# Patient Record
Sex: FEMALE | Race: BLACK OR AFRICAN AMERICAN | URBAN - METROPOLITAN AREA
[De-identification: names, ages, dates, MRNs, and addresses within clinical notes are randomized per-mention and may not be internally consistent; named-entity substitution may affect disease eponyms.]

---

## 2017-02-06 ENCOUNTER — OFFICE VISIT (OUTPATIENT)
Dept: FAMILY MEDICINE CLINIC | Age: 37
End: 2017-02-06

## 2017-02-06 ENCOUNTER — HOSPITAL ENCOUNTER (OUTPATIENT)
Dept: LAB | Age: 37
Discharge: HOME | End: 2017-02-06
Payer: COMMERCIAL

## 2017-02-06 VITALS
BODY MASS INDEX: 40.97 KG/M2 | SYSTOLIC BLOOD PRESSURE: 152 MMHG | HEIGHT: 64 IN | HEART RATE: 120 BPM | TEMPERATURE: 98.7 F | OXYGEN SATURATION: 99 % | WEIGHT: 240 LBS | RESPIRATION RATE: 20 BRPM | DIASTOLIC BLOOD PRESSURE: 104 MMHG

## 2017-02-06 DIAGNOSIS — E66.01 MORBID OBESITY DUE TO EXCESS CALORIES (HCC): ICD-10-CM

## 2017-02-06 DIAGNOSIS — L83 ACANTHOSIS: ICD-10-CM

## 2017-02-06 DIAGNOSIS — I10 ESSENTIAL HYPERTENSION: Primary | ICD-10-CM

## 2017-02-06 DIAGNOSIS — Z72.0 TOBACCO USE: ICD-10-CM

## 2017-02-06 DIAGNOSIS — I10 ESSENTIAL HYPERTENSION: ICD-10-CM

## 2017-02-06 LAB
ALBUMIN SERPL BCP-MCNC: 3.5 G/DL (ref 3.4–5)
ALBUMIN/GLOB SERPL: 1 {RATIO} (ref 0.8–1.7)
ALP SERPL-CCNC: 111 U/L (ref 45–117)
ALT SERPL-CCNC: 49 U/L (ref 13–56)
ANION GAP BLD CALC-SCNC: 12 MMOL/L (ref 3–18)
AST SERPL W P-5'-P-CCNC: 53 U/L (ref 15–37)
BILIRUB SERPL-MCNC: 0.2 MG/DL (ref 0.2–1)
BUN SERPL-MCNC: 9 MG/DL (ref 7–18)
BUN/CREAT SERPL: 9 (ref 12–20)
CALCIUM SERPL-MCNC: 9 MG/DL (ref 8.5–10.1)
CHLORIDE SERPL-SCNC: 103 MMOL/L (ref 100–108)
CHOLEST SERPL-MCNC: 185 MG/DL
CO2 SERPL-SCNC: 25 MMOL/L (ref 21–32)
CREAT SERPL-MCNC: 0.95 MG/DL (ref 0.6–1.3)
ERYTHROCYTE [DISTWIDTH] IN BLOOD BY AUTOMATED COUNT: 18.5 % (ref 11.6–14.5)
GLOBULIN SER CALC-MCNC: 3.6 G/DL (ref 2–4)
GLUCOSE SERPL-MCNC: 72 MG/DL (ref 74–99)
HBA1C MFR BLD: 5.3 % (ref 4.2–5.6)
HCT VFR BLD AUTO: 34.8 % (ref 35–45)
HDLC SERPL-MCNC: 63 MG/DL (ref 40–60)
HDLC SERPL: 2.9 {RATIO} (ref 0–5)
HGB BLD-MCNC: 10.7 G/DL (ref 12–16)
LDLC SERPL CALC-MCNC: 84.4 MG/DL (ref 0–100)
LIPID PROFILE,FLP: ABNORMAL
MCH RBC QN AUTO: 24.7 PG (ref 24–34)
MCHC RBC AUTO-ENTMCNC: 30.7 G/DL (ref 31–37)
MCV RBC AUTO: 80.2 FL (ref 74–97)
PLATELET # BLD AUTO: 418 K/UL (ref 135–420)
PMV BLD AUTO: 9.3 FL (ref 9.2–11.8)
POTASSIUM SERPL-SCNC: 3.9 MMOL/L (ref 3.5–5.5)
PROT SERPL-MCNC: 7.1 G/DL (ref 6.4–8.2)
RBC # BLD AUTO: 4.34 M/UL (ref 4.2–5.3)
SODIUM SERPL-SCNC: 140 MMOL/L (ref 136–145)
TRIGL SERPL-MCNC: 188 MG/DL (ref ?–150)
VLDLC SERPL CALC-MCNC: 37.6 MG/DL
WBC # BLD AUTO: 6 K/UL (ref 4.6–13.2)

## 2017-02-06 PROCEDURE — 85027 COMPLETE CBC AUTOMATED: CPT | Performed by: INTERNAL MEDICINE

## 2017-02-06 PROCEDURE — 80061 LIPID PANEL: CPT | Performed by: INTERNAL MEDICINE

## 2017-02-06 PROCEDURE — 36415 COLL VENOUS BLD VENIPUNCTURE: CPT | Performed by: INTERNAL MEDICINE

## 2017-02-06 PROCEDURE — 80053 COMPREHEN METABOLIC PANEL: CPT | Performed by: INTERNAL MEDICINE

## 2017-02-06 PROCEDURE — 83036 HEMOGLOBIN GLYCOSYLATED A1C: CPT | Performed by: INTERNAL MEDICINE

## 2017-02-06 RX ORDER — TRIAMTERENE AND HYDROCHLOROTHIAZIDE 37.5; 25 MG/1; MG/1
1 CAPSULE ORAL DAILY
Qty: 90 CAP | Refills: 0 | Status: SHIPPED | OUTPATIENT
Start: 2017-02-06 | End: 2017-03-13 | Stop reason: SDUPTHER

## 2017-02-06 RX ORDER — AMLODIPINE AND BENAZEPRIL HYDROCHLORIDE 10; 20 MG/1; MG/1
1 CAPSULE ORAL DAILY
Qty: 90 CAP | Refills: 0 | Status: SHIPPED | OUTPATIENT
Start: 2017-02-06 | End: 2017-03-13 | Stop reason: SDUPTHER

## 2017-02-06 RX ORDER — TRIAMTERENE AND HYDROCHLOROTHIAZIDE 37.5; 25 MG/1; MG/1
CAPSULE ORAL DAILY
COMMUNITY
End: 2017-02-06 | Stop reason: SDUPTHER

## 2017-02-06 RX ORDER — AMLODIPINE AND BENAZEPRIL HYDROCHLORIDE 10; 20 MG/1; MG/1
1 CAPSULE ORAL DAILY
COMMUNITY
End: 2017-02-06 | Stop reason: SDUPTHER

## 2017-02-06 NOTE — PROGRESS NOTES
Assessment/Plan:    Jas was seen today for hypertension. Diagnoses and all orders for this visit:    Essential hypertension- elevated. F/u in 1 mo. Cont current regimen. Dash diet. -     amLODIPine-benazepril (LOTREL) 10-20 mg per capsule; Take 1 Cap by mouth daily. -     triamterene-hydroCHLOROthiazide (DYAZIDE) 37.5-25 mg per capsule; Take 1 Cap by mouth daily.  -     METABOLIC PANEL, COMPREHENSIVE; Future  -     LIPID PANEL; Future  -     CBC W/O DIFF; Future    Morbid obesity due to excess calories (HCC)  -     naltrexone-buPROPion (CONTRAVE) 8-90 mg TbER ER tablet; Week 1 1 tab PO QAM, Week 2 1QAM 1QHS, Week 3 2QAM 1 QHS, Week 4 & beyond 2QAM 2QHS    Tobacco use  - bupropion will help with cessation. Acanthosis  -     HEMOGLOBIN A1C W/O EAG; Future        The plan was discussed with the patient. The patient verbalized understanding and is in agreement with the plan. All medication potential side effects were discussed with the patient. Health Maintenance: pap- 1/1/2016    Radha Santiago is a 39 y.o.  female and presents with Esophageal Cancer     Subjective:  Pt is here to establish care. HTN - states she has white coat syndrome. States it's much better when she checks it at work. Obesity - states she wants to lose weight. She occ eats breakfast (boiled egg and sausage or a parfait). Eats salad for lunch. States she skips dinner sometimes, or she will eat baked meat. Only occ drinks soda or sweetened beverages. ROS:  Constitutional: No recent weight change. No weakness/fatigue. No f/c. Skin: No rashes, change in nails/hair, itching   HENT: No HA, dizziness. No hearing loss/tinnitus. No nasal congestion/discharge. Eyes: No change in vision, double/blurred vision or eye pain/redness. Cardiovascular: No CP/palpitations. No PAYNE/orthopnea/PND. Respiratory: No cough/sputum, dyspnea, wheezing. Gastointestinal: No dysphagia, reflux. No n/v. No constipation/diarrhea.   No melena/rectal bleeding. Genitourinary: No dysuria, urinary hesitancy, nocturia, hematuria. No incontinence. Musculoskeletal: No joint pain/stiffness. No muscle pain/tenderness. Endo: No heat/cold intolerance, no polyuria/polydypsia. Heme: No h/o anemia. No easy bleeding/bruising. Allergy/Immunology: No seasonal rhinitis. Denies frequent colds, sinus/ear infections. Neurological: No seizures/numbness/weakness. No paresthesias. Psychiatric:  No depression, anxiety. PMH:  Past Medical History   Diagnosis Date    Hypertension      PSH:  History reviewed. No pertinent past surgical history. SH:  Social History   Substance Use Topics    Smoking status: Current Every Day Smoker     Packs/day: 0.25     Years: 10.00    Smokeless tobacco: Never Used    Alcohol use 2.4 oz/week     0 Cans of beer, 4 Glasses of wine, 0 Shots of liquor per week     FH:  Family History   Problem Relation Age of Onset    Hypertension Mother     No Known Problems Father      Medications/Allergies:    Current Outpatient Prescriptions:     amLODIPine-benazepril (LOTREL) 10-20 mg per capsule, Take 1 Cap by mouth daily. , Disp: , Rfl:     triamterene-hydroCHLOROthiazide (DYAZIDE) 37.5-25 mg per capsule, Take  by mouth daily. , Disp: , Rfl:   No Known Allergies    Objective:  Visit Vitals    BP (!) 152/104 (BP 1 Location: Left arm, BP Patient Position: Sitting)    Pulse (!) 120    Temp 98.7 °F (37.1 °C)    Resp 20    Ht 5' 4\" (1.626 m)    Wt 240 lb (108.9 kg)    LMP 02/02/2017    SpO2 99%    BMI 41.2 kg/m2      Constitutional: Well developed, nourished, no distress, alert, obese habitus   HENT: Exterior ears and tympanic membranes normal bilaterally. Supple neck. No thyromegaly or lymphadenopathy. Oropharynx clear and moist mucous membranes. Eyes: Conjunctiva normal. PERRL. Cardiovascular: S1, S2.  Reg, tachy. No murmurs/rubs. No thrills palpated. No carotid bruits. Intact distal pulses. No edema. Pulmonary/Chest Wall: No abnormalities on inspection. Clear to auscultation bilaterally. No wheezing/rhonchi. Normal effort. GI: Soft, nontender, nondistended. Normal active bowel sounds. No  masses on palpation. No hepatosplenomegaly. Musculoskeletal: Gait normal.  Joints without deformity/tenderness. Neurological: Appropriate. No focal motor or sensory deficits. Speech normal.   Skin: No lesions/rashes on inspection. +acanthosis   Psych: Appropriate affect, judgement and insight. Short-term memory intact.

## 2017-02-06 NOTE — PROGRESS NOTES
Leatha Merlos is a 39 y.o. female  Chief Complaint   Patient presents with    Esophageal Cancer     1. Have you been to the ER, urgent care clinic since your last visit? Hospitalized since your last visit? No    2. Have you seen or consulted any other health care providers outside of the [facility] since your last visit? Include any pap smears or colon screening.  No    Pt states that all of her vaccinations are up to date  Pt is fasting today

## 2017-02-06 NOTE — PATIENT INSTRUCTIONS
Naltrexone/Bupropion (By mouth)   Bupropion Hydrochloride (olx-CTLY-jtu-on adele-droe-KLOR-carmelo), Naltrexone Hydrochloride (nal-TREX-one adele-droe-KLOR-carmelo)  Used with diet and exercise to help you lose weight. Brand Name(s):Contrave   There may be other brand names for this medicine. When This Medicine Should Not Be Used: This medicine is not right for everyone. Do not use it if you had an allergic reaction to naltrexone or bupropion, you are pregnant, or you have seizures, anorexia, or bulimia. How to Use This Medicine:   Long Acting Tablet  · Take your medicine as directed. Your dose may need to be changed several times to find what works best for you. · It is best to take this medicine with food or milk. However, do not take this medicine with high-fat meals. This may increase your risk of seizures. · Swallow the extended-release tablet whole. Do not crush, break, or chew it. · This medicine should come with a Medication Guide. Ask your pharmacist for a copy if you do not have one. · Missed dose: Skip the missed dose and go back to your regular dosing schedule. Never take extra medicine to make up for a missed dose. · Store the medicine in a closed container at room temperature, away from heat, moisture, and direct light. Drugs and Foods to Avoid:   Ask your doctor or pharmacist before using any other medicine, including over-the-counter medicines, vitamins, and herbal products. · Do not use this medicine and an MAO inhibitor (MAOI) within 14 days of each other. Do not take this medicine if you are using or have used heroin or other narcotic drugs (such as buprenorphine, codeine, methadone, or other habit-forming painkillers) within the past 7 to 10 days. Do not use naltrexone/bupropion if you are also using Zyban® to quit smoking or Aplenzin® or Wellbutrin® for depression, because they also contain bupropion. · Some medicines and foods can affect how naltrexone/bupropion works.  Tell your doctor if you are using any of the following:  ¨ Amantadine, amiloride, cimetidine, clopidogrel, dopamine, famotidine, levodopa, memantine, metformin, metoprolol, oxaliplatin, pindolol, ranitidine, theophylline, ticlopidine, varenicline  ¨ Insulin or diabetes medicine  ¨ Medicine to treat depression  ¨ Medicine to treat mental illness (haloperidol, risperidone, thioridazine)  ¨ Medicine to treat heart rhythm problems (flecainide, procainamide, propafenone)  ¨ Medicine to treat HIV or AIDS (efavirenz, lopinavir, ritonavir)  ¨ Medicine for pain, diarrhea, cough, or colds  ¨ Steroid (such as dexamethasone, hydrocortisone, methylprednisolone, prednisolone, prednisone)  · Limit alcohol, or do not drink alcohol at all while you are using this medicine. Warnings While Using This Medicine:   · It is not safe to take this medicine during pregnancy. It could harm an unborn baby. Tell your doctor right away if you become pregnant. · Tell your doctor if you have kidney disease, liver disease, diabetes, glaucoma, heart disease, or high blood pressure. · Tell your doctor if you take barbiturates, benzodiazepines, antiseizure medicine, or sedatives, or if you recently stopped taking them. Tell your doctor if you have a history of drug addiction, or if you drink alcohol. · For some children, teenagers, and young adults, this medicine may increase mental or emotional problems. This may lead to thoughts of suicide and violence. Talk with your doctor right away if you have any thoughts or behavior changes that concern you. Tell your doctor if you or anyone in your family has a history of bipolar disorder or suicide attempts.   · This medicine may cause the following problems:  ¨ Increased risk of seizure  ¨ High blood pressure or heart rate  ¨ Serious allergic and skin reactions  ¨ Liver problems  ¨ Increased risk of hypoglycemia (low blood sugar) in patients with diabetes  · Do not breastfeed while you are using this medicine, unless your doctor says it is okay. · You have a higher risk of accidental overdose, serious injury, or death if you use heroin or any other narcotic medicine while you are being treated with this medicine. Also, naltrexone prevents you from feeling the effects of heroin if you use it. · Do not stop using this medicine suddenly. Your doctor will need to slowly decrease your dose before you stop it completely. · Tell any doctor or dentist who treats you that you are using this medicine. This medicine may affect certain medical test results. · Your doctor will check your progress and the effects of this medicine at regular visits. Keep all appointments. · Keep all medicine out of the reach of children. Never share your medicine with anyone. Possible Side Effects While Using This Medicine:   Call your doctor right away if you notice any of these side effects:  · Allergic reaction: Itching or hives, swelling in your face or hands, swelling or tingling in your mouth or throat, chest tightness, trouble breathing  · Blistering, peeling, or red skin rash  · Chest pain, trouble breathing, fast, slow, or pounding heartbeat  · Dark urine or pale stools, nausea, vomiting, loss of appetite, stomach pain, yellow skin or eyes  · Eye pain, vision changes, seeing halos around lights  · Muscle or joint pain, fever with rash  · Seeing or hearing things that are not there, feeling like people are against you  · Seizures  · Sudden increase in energy, racing thoughts, trouble sleeping  · Thoughts of hurting yourself, worsening depression, severe agitation or confusion  If you notice these less serious side effects, talk with your doctor:   · Dry mouth  · Headache, dizziness  · Nausea, constipation, diarrhea  If you notice other side effects that you think are caused by this medicine, tell your doctor. Call your doctor for medical advice about side effects.  You may report side effects to FDA at 9-095-IDE-5219  © 2016 [facility] [address] [facility] is for End User's use only and may not be sold, redistributed or otherwise used for commercial purposes. The above information is an  only. It is not intended as medical advice for individual conditions or treatments. Talk to your doctor, nurse or pharmacist before following any medical regimen to see if it is safe and effective for you.

## 2017-02-06 NOTE — MR AVS SNAPSHOT
Visit Information Date & Time Provider Department Dept. Phone Encounter #  
 2/6/2017  8:30 AM Simona Caldera, [facility] [phone] [id number] Allergies as of 2/6/2017  Review Complete On: 2/6/2017 By: Martha Ortiz LPN No Known Allergies Current Immunizations  Never Reviewed No immunizations on file. Not reviewed this visit You Were Diagnosed With   
  
 Codes Comments Essential hypertension    -  Primary ICD-10-CM: I10 
ICD-9-CM: 401.9 Morbid obesity due to excess calories (HCC)     ICD-10-CM: E66.01 
ICD-9-CM: 278.01 Tobacco use     ICD-10-CM: Z72.0 ICD-9-CM: 305.1 Acanthosis     ICD-10-CM: L83 
ICD-9-CM: 701.2 Vitals BP Pulse Temp Resp Height(growth percentile) Weight(growth percentile) (!) 152/104 (BP 1 Location: Left arm, BP Patient Position: Sitting) (!) 120 98.7 °F (37.1 °C) 20 5' 4\" (1.626 m) 240 lb (108.9 kg) LMP SpO2 BMI Smoking Status 02/02/2017 99% 41.2 kg/m2 Current Every Day Smoker Vitals History BMI and BSA Data Body Mass Index Body Surface Area  
 41.2 kg/m 2 2.22 m 2 Preferred Pharmacy Pharmacy Name Phone [facility] [address] [facility] [place] [address] [address] [phone] Your Updated Medication List  
  
   
This list is accurate as of: 2/6/17  8:59 AM.  Always use your most recent med list. amLODIPine-benazepril 10-20 mg per capsule Commonly known as:  Ba Pilar Take 1 Cap by mouth daily. naltrexone-buPROPion 8-90 mg Tber ER tablet Commonly known as:  Mendez Valiente Week 1 1 tab PO QAM, Week 2 1QAM 1QHS, Week 3 2QAM 1 QHS, Week 4 & beyond 2QAM 2QHS  
  
 triamterene-hydroCHLOROthiazide 37.5-25 mg per capsule Commonly known as:  Ino Majors Take 1 Cap by mouth daily. Prescriptions Printed  Refills  
 naltrexone-buPROPion (CONTRAVE) 8-90 mg TbER ER tablet 0  
 Sig: Week 1 1 tab PO QAM, Week 2 1QAM 1QHS, Week 3 2QAM 1 QHS, Week 4 & beyond 2QAM 2QHS Class: Print Prescriptions Sent to Pharmacy Refills  
 amLODIPine-benazepril (LOTREL) 10-20 mg per capsule 0 Sig: Take 1 Cap by mouth daily. Class: Normal  
 Pharmacy: [facility] [address], [place] [address] [place] Ph #: [phone] Route: Oral  
 triamterene-hydroCHLOROthiazide (DYAZIDE) 37.5-25 mg per capsule 0 Sig: Take 1 Cap by mouth daily. Class: Normal  
 Pharmacy: [facility] [address] [facility] [place] [facility] [address] Ph #: [phone] Route: Oral  
  
To-Do List   
 02/06/2017 Lab:  CBC W/O DIFF   
  
 02/06/2017 Lab:  HEMOGLOBIN A1C W/O EAG   
  
 02/06/2017 Lab:  LIPID PANEL   
  
 02/06/2017 Lab:  METABOLIC PANEL, COMPREHENSIVE Patient Instructions Naltrexone/Bupropion (By mouth) Bupropion Hydrochloride (mqq-DNZF-trf-on adele-droe-KLOR-carmelo), Naltrexone Hydrochloride (nal-TREX-one adele-droe-KLOR-carmelo) Used with diet and exercise to help you lose weight. Brand Name(s):Contrave There may be other brand names for this medicine. When This Medicine Should Not Be Used: This medicine is not right for everyone. Do not use it if you had an allergic reaction to naltrexone or bupropion, you are pregnant, or you have seizures, anorexia, or bulimia. How to Use This Medicine:  
Long Acting Tablet · Take your medicine as directed. Your dose may need to be changed several times to find what works best for you. · It is best to take this medicine with food or milk. However, do not take this medicine with high-fat meals. This may increase your risk of seizures. · Swallow the extended-release tablet whole. Do not crush, break, or chew it. · This medicine should come with a Medication Guide. Ask your pharmacist for a copy if you do not have one. · Missed dose: Skip the missed dose and go back to your regular dosing schedule. Never take extra medicine to make up for a missed dose. · Store the medicine in a closed container at room temperature, away from heat, moisture, and direct light. Drugs and Foods to Avoid: Ask your doctor or pharmacist before using any other medicine, including over-the-counter medicines, vitamins, and herbal products. · Do not use this medicine and an MAO inhibitor (MAOI) within 14 days of each other. Do not take this medicine if you are using or have used heroin or other narcotic drugs (such as buprenorphine, codeine, methadone, or other habit-forming painkillers) within the past 7 to 10 days. Do not use naltrexone/bupropion if you are also using Zyban® to quit smoking or Aplenzin® or Wellbutrin® for depression, because they also contain bupropion. · Some medicines and foods can affect how naltrexone/bupropion works. Tell your doctor if you are using any of the following: ¨ Amantadine, amiloride, cimetidine, clopidogrel, dopamine, famotidine, levodopa, memantine, metformin, metoprolol, oxaliplatin, pindolol, ranitidine, theophylline, ticlopidine, varenicline ¨ Insulin or diabetes medicine ¨ Medicine to treat depression ¨ Medicine to treat mental illness (haloperidol, risperidone, thioridazine) ¨ Medicine to treat heart rhythm problems (flecainide, procainamide, propafenone) ¨ Medicine to treat HIV or AIDS (efavirenz, lopinavir, ritonavir) ¨ Medicine for pain, diarrhea, cough, or colds ¨ Steroid (such as dexamethasone, hydrocortisone, methylprednisolone, prednisolone, prednisone) · Limit alcohol, or do not drink alcohol at all while you are using this medicine. Warnings While Using This Medicine: · It is not safe to take this medicine during pregnancy. It could harm an unborn baby. Tell your doctor right away if you become pregnant.  
· Tell your doctor if you have kidney disease, liver disease, diabetes, glaucoma, heart disease, or high blood pressure. · Tell your doctor if you take barbiturates, benzodiazepines, antiseizure medicine, or sedatives, or if you recently stopped taking them. Tell your doctor if you have a history of drug addiction, or if you drink alcohol. · For some children, teenagers, and young adults, this medicine may increase mental or emotional problems. This may lead to thoughts of suicide and violence. Talk with your doctor right away if you have any thoughts or behavior changes that concern you. Tell your doctor if you or anyone in your family has a history of bipolar disorder or suicide attempts. · This medicine may cause the following problems: 
¨ Increased risk of seizure ¨ High blood pressure or heart rate ¨ Serious allergic and skin reactions ¨ Liver problems ¨ Increased risk of hypoglycemia (low blood sugar) in patients with diabetes · Do not breastfeed while you are using this medicine, unless your doctor says it is okay. · You have a higher risk of accidental overdose, serious injury, or death if you use heroin or any other narcotic medicine while you are being treated with this medicine. Also, naltrexone prevents you from feeling the effects of heroin if you use it. · Do not stop using this medicine suddenly. Your doctor will need to slowly decrease your dose before you stop it completely. · Tell any doctor or dentist who treats you that you are using this medicine. This medicine may affect certain medical test results. · Your doctor will check your progress and the effects of this medicine at regular visits. Keep all appointments. · Keep all medicine out of the reach of children. Never share your medicine with anyone. Possible Side Effects While Using This Medicine:  
Call your doctor right away if you notice any of these side effects: · Allergic reaction: Itching or hives, swelling in your face or hands, swelling or tingling in your mouth or throat, chest tightness, trouble breathing · Blistering, peeling, or red skin rash · Chest pain, trouble breathing, fast, slow, or pounding heartbeat · Dark urine or pale stools, nausea, vomiting, loss of appetite, stomach pain, yellow skin or eyes · Eye pain, vision changes, seeing halos around lights · Muscle or joint pain, fever with rash · Seeing or hearing things that are not there, feeling like people are against you · Seizures · Sudden increase in energy, racing thoughts, trouble sleeping · Thoughts of hurting yourself, worsening depression, severe agitation or confusion If you notice these less serious side effects, talk with your doctor: · Dry mouth 
· Headache, dizziness · Nausea, constipation, diarrhea If you notice other side effects that you think are caused by this medicine, tell your doctor. Call your doctor for medical advice about side effects. You may report side effects to FDA at 2-797-FDA-1658 © 2016 [address] is for End User's use only and may not be sold, redistributed or otherwise used for commercial purposes. The above information is an  only. It is not intended as medical advice for individual conditions or treatments. Talk to your doctor, nurse or pharmacist before following any medical regimen to see if it is safe and effective for you. Introducing [facility]! Solomon Lopes introduces [employer] patient portal. Now you can access parts of your medical record, email your doctor's office, and request medication refills online. 1. In your internet browser, go to [email or web address] 2. Click on the First Time User? Click Here link in the Sign In box. You will see the New Member Sign Up page. 3. Enter your [employer] Access Code exactly as it appears below. You will not need to use this code after youve completed the sign-up process.  If you do not sign up before the expiration date, you must request a new code. · [employer] Access Code: [email or web address] Expires: 5/7/2017  8:40 AM 
 
4. Enter the last four digits of your Social Security Number (xxxx) and Date of Birth (mm/dd/yyyy) as indicated and click Submit. You will be taken to the next sign-up page. 5. Create a [employer] ID. This will be your [employer] login ID and cannot be changed, so think of one that is secure and easy to remember. 6. Create a [employer] password. You can change your password at any time. 7. Enter your Password Reset Question and Answer. This can be used at a later time if you forget your password. 8. Enter your e-mail address. You will receive e-mail notification when new information is available in [address] 9. Click Sign Up. You can now view and download portions of your medical record. 10. Click the Download Summary menu link to download a portable copy of your medical information. If you have questions, please visit the Frequently Asked Questions section of the [employer] website. Remember, [employer] is NOT to be used for urgent needs. For medical emergencies, dial 911. Now available from your iPhone and Android! Please provide this summary of care documentation to your next provider. Your primary care clinician is listed as Dariela 13. If you have any questions after today's visit, please call [phone].

## 2017-03-13 ENCOUNTER — OFFICE VISIT (OUTPATIENT)
Dept: FAMILY MEDICINE CLINIC | Age: 37
End: 2017-03-13

## 2017-03-13 VITALS
BODY MASS INDEX: 39.57 KG/M2 | WEIGHT: 231.8 LBS | TEMPERATURE: 98.1 F | HEART RATE: 112 BPM | DIASTOLIC BLOOD PRESSURE: 86 MMHG | HEIGHT: 64 IN | SYSTOLIC BLOOD PRESSURE: 148 MMHG | RESPIRATION RATE: 20 BRPM | OXYGEN SATURATION: 99 %

## 2017-03-13 DIAGNOSIS — I10 ESSENTIAL HYPERTENSION: Primary | ICD-10-CM

## 2017-03-13 DIAGNOSIS — E66.01 MORBID OBESITY DUE TO EXCESS CALORIES (HCC): ICD-10-CM

## 2017-03-13 RX ORDER — TRIAMTERENE AND HYDROCHLOROTHIAZIDE 37.5; 25 MG/1; MG/1
1 CAPSULE ORAL DAILY
Qty: 90 CAP | Refills: 1 | Status: SHIPPED | OUTPATIENT
Start: 2017-03-13 | End: 2017-11-10 | Stop reason: SDUPTHER

## 2017-03-13 RX ORDER — AMLODIPINE AND BENAZEPRIL HYDROCHLORIDE 10; 20 MG/1; MG/1
1 CAPSULE ORAL DAILY
Qty: 90 CAP | Refills: 1 | Status: SHIPPED | OUTPATIENT
Start: 2017-03-13 | End: 2017-08-16 | Stop reason: SDUPTHER

## 2017-03-13 NOTE — PROGRESS NOTES
Dorothy Rodríguez is a 39 y.o. female  Chief Complaint   Patient presents with    Hypertension     1 month follow up     1. Have you been to the ER, urgent care clinic since your last visit? Hospitalized since your last visit? No    2. Have you seen or consulted any other health care providers outside of the [address] [place] [address] since your last visit? Include any pap smears or colon screening.  No

## 2017-03-13 NOTE — PROGRESS NOTES
Assessment/Plan:    1. Morbid obesity due to excess calories ([address]- refilled. F/u in 1 mo. - naltrexone-buPROPion (CONTRAVE) 8-90 mg TbER ER tablet; Take 2 Tabs by mouth two (2) times a day. Dispense: 120 Tab; Refill: 0    2. Essential hypertension  -better controlled at home. F/u in 6 mos for bp.  - amLODIPine-benazepril (LOTREL) 10-20 mg per capsule; Take 1 Cap by mouth daily. Dispense: 90 Cap; Refill: 1  - triamterene-hydroCHLOROthiazide (DYAZIDE) 37.5-25 mg per capsule; Take 1 Cap by mouth daily. Dispense: 90 Cap; Refill: 1    The plan was discussed with the patient. The patient verbalized understanding and is in agreement with the plan. All medication potential side effects were discussed with the patient. Health Maintenance:   Health Maintenance   Topic Date Due    Pneumococcal 19-64 Medium Risk (1 of 1 - PPSV23) 11/16/1998    DTaP/Tdap/Td series (1 - Tdap) 11/16/2000    PAP AKA CERVICAL CYTOLOGY  11/16/2000    INFLUENZA AGE 9 TO ADULT  08/01/2016       Jas Caruso is a 39 y.o. female and presents with Hypertension (1 month follow up)     Subjective:  Obesity- started on contrave last mo. Has lost 9lb.    htn- remains elevated. Monitors it at work. bp better controlled at work/home. ROS:  Constitutional: No recent weight change. No weakness/fatigue. No f/c. HENT: No HA, dizziness. No hearing loss/tinnitus. No nasal congestion/discharge. Eyes: No change in vision, double/blurred vision or eye pain/redness. Cardiovascular: No CP/palpitations. No PAYNE/orthopnea/PND. Respiratory: No cough/sputum, dyspnea, wheezing. Neurological: No seizures/numbness/weakness. No paresthesias. The problem list was updated as a part of today's visit. Patient Active Problem List   Diagnosis Code    Tobacco use Z72.0    Essential hypertension I10    Morbid obesity due to excess calories (HCC) E66.01       The PSH, FH were reviewed.     SH:  Social History   Substance Use Topics    Smoking status: Current Every Day Smoker     Packs/day: 0.25     Years: 10.00    Smokeless tobacco: Never Used    Alcohol use 2.4 oz/week     0 Cans of beer, 4 Glasses of wine, 0 Shots of liquor per week       Medications/Allergies:  Current Outpatient Prescriptions on File Prior to Visit   Medication Sig Dispense Refill    amLODIPine-benazepril (LOTREL) 10-20 mg per capsule Take 1 Cap by mouth daily. 90 Cap 0    triamterene-hydroCHLOROthiazide (DYAZIDE) 37.5-25 mg per capsule Take 1 Cap by mouth daily. 90 Cap 0    naltrexone-buPROPion (CONTRAVE) 8-90 mg TbER ER tablet Week 1 1 tab PO QAM, Week 2 1QAM 1QHS, Week 3 2QAM 1 QHS, Week 4 & beyond 2QAM 2QHS 70 Tab 0     No current facility-administered medications on file prior to visit. No Known Allergies    Objective:  Visit Vitals    /86 (BP 1 Location: Left arm, BP Patient Position: Sitting)    Pulse (!) 112    Temp 98.1 °F (36.7 °C) (Temporal)    Resp 20    Ht 5' 4\" (1.626 m)    Wt 231 lb 12.8 oz (105.1 kg)    LMP 02/20/2017    SpO2 99%    BMI 39.79 kg/m2      Constitutional: Well developed, nourished, no distress, alert, obese habitus   CV: S1, S2.  Reg, tachy. No murmurs/rubs. No thrills palpated. No carotid bruits. Intact distal pulses. No edema. Pulm: No abnormalities on inspection. Clear to auscultation bilaterally. No wheezing/rhonchi. Normal effort. Neuro: A/O x 3. No focal motor or sensory deficits.  Speech normal.     Labwork and Ancillary Studies:    CBC w/Diff  Lab Results   Component Value Date/Time    WBC 6.0 02/06/2017 09:31 AM    HGB 10.7 02/06/2017 09:31 AM    PLATELET 199 97/88/1437 09:31 AM         Basic Metabolic Profile/LFTs  Lab Results   Component Value Date/Time    Sodium 140 02/06/2017 09:31 AM    Potassium 3.9 02/06/2017 09:31 AM    Chloride 103 02/06/2017 09:31 AM    CO2 25 02/06/2017 09:31 AM    Anion gap 12 02/06/2017 09:31 AM    Glucose 72 02/06/2017 09:31 AM    BUN 9 02/06/2017 09:31 AM    Creatinine 0.95 02/06/2017 09:31 AM    BUN/Creatinine ratio 9 02/06/2017 09:31 AM    GFR est AA >60 02/06/2017 09:31 AM    GFR est non-AA >60 02/06/2017 09:31 AM    Calcium 9.0 02/06/2017 09:31 AM      Lab Results   Component Value Date/Time    ALT (SGPT) 49 02/06/2017 09:31 AM    AST (SGOT) 53 02/06/2017 09:31 AM    Alk.  phosphatase 111 02/06/2017 09:31 AM    Bilirubin, total 0.2 02/06/2017 09:31 AM       Cholesterol  Lab Results   Component Value Date/Time    Cholesterol, total 185 02/06/2017 09:31 AM    HDL Cholesterol 63 02/06/2017 09:31 AM    LDL, calculated 84.4 02/06/2017 09:31 AM    Triglyceride 188 02/06/2017 09:31 AM    CHOL/HDL Ratio 2.9 02/06/2017 09:31 AM

## 2017-03-13 NOTE — MR AVS SNAPSHOT
Visit Information Date & Time Provider Department Dept. Phone Encounter #  
 3/13/2017  8:00 AM Ok Ramymark, [facility] [phone] [id number] Upcoming Health Maintenance Date Due Pneumococcal 19-64 Medium Risk (1 of 1 - PPSV23) 11/16/1998 DTaP/Tdap/Td series (1 - Tdap) 11/16/2000 PAP AKA CERVICAL CYTOLOGY 11/16/2000 INFLUENZA AGE 9 TO ADULT 8/1/2016 Allergies as of 3/13/2017  Review Complete On: 2/6/2017 By: Ok Perez MD  
 No Known Allergies Current Immunizations  Never Reviewed No immunizations on file. Not reviewed this visit You Were Diagnosed With   
  
 Codes Comments Essential hypertension    -  Primary ICD-10-CM: I10 
ICD-9-CM: 401.9 Morbid obesity due to excess calories (HCC)     ICD-10-CM: E66.01 
ICD-9-CM: 278.01 Vitals BP Pulse Temp Resp Height(growth percentile) 148/86 (BP 1 Location: Left arm, BP Patient Position: Sitting) (!) 112 98.1 °F (36.7 °C) (Temporal) 20 5' 4\" (1.626 m) Weight(growth percentile) LMP SpO2 BMI Smoking Status 231 lb 12.8 oz (105.1 kg) 02/20/2017 99% 39.79 kg/m2 Current Every Day Smoker BMI and BSA Data Body Mass Index Body Surface Area  
 39.79 kg/m 2 2.18 m 2 Preferred Pharmacy Pharmacy Name Phone [facility] [address], [place] [address] [phone] Your Updated Medication List  
  
   
This list is accurate as of: 3/13/17  8:16 AM.  Always use your most recent med list. amLODIPine-benazepril 10-20 mg per capsule Commonly known as:  Noemy Deep Take 1 Cap by mouth daily. naltrexone-buPROPion 8-90 mg Tber ER tablet Commonly known as:  Vilinda Boop Take 2 Tabs by mouth two (2) times a day. triamterene-hydroCHLOROthiazide 37.5-25 mg per capsule Commonly known as:  Bing Blocker Take 1 Cap by mouth daily. Prescriptions Printed Refills naltrexone-buPROPion (CONTRAVE) 8-90 mg TbER ER tablet 0 Sig: Take 2 Tabs by mouth two (2) times a day. Class: Print Route: Oral  
  
Prescriptions Sent to Pharmacy Refills  
 amLODIPine-benazepril (LOTREL) 10-20 mg per capsule 1 Sig: Take 1 Cap by mouth daily. Class: Normal  
 Pharmacy: [facility] [address] [facility] [place] [address] [place]re [place] Ph #: [phone] Route: Oral  
 triamterene-hydroCHLOROthiazide (DYAZIDE) 37.5-25 mg per capsule 1 Sig: Take 1 Cap by mouth daily. Class: Normal  
 Pharmacy: [facility] [address] [facility] [place] [address]Paula [address] Ph #: [phone] Route: Oral  
  
Introducing [facility] & HEALTH SERVICES! [facility] introduces [employer] patient portal. Now you can access parts of your medical record, email your doctor's office, and request medication refills online. 1. In your internet browser, go to [email or web address] 2. Click on the First Time User? Click Here link in the Sign In box. You will see the New Member Sign Up page. 3. Enter your Quantifind Access Code exactly as it appears below. You will not need to use this code after youve completed the sign-up process. If you do not sign up before the expiration date, you must request a new code. · Quant[employer]ind Access Code: [email or web address] Expires: 5/7/2017  9:40 AM 
 
4. Enter the last four digits of your Social Security Number (xxxx) and Date of Birth (mm/dd/yyyy) as indicated and click Submit. You will be taken to the next sign-up page. 5. Create a Quintict ID. This will be your Quantifind login ID and cannot be changed, so think of one that is secure and easy to remember. 6. Create a Quantifind password. You can change your password at any time. 7. Enter your Password Reset Question and Answer. This can be used at a later time if you forget your password. 8. Enter your e-mail address. You will receive e-mail notification when new information is available in [address] 9. Click Sign Up. You can now view and download portions of your medical record. 10. Click the Download Summary menu link to download a portable copy of your medical information. If you have questions, please visit the Frequently Asked Questions section of the [employer] website. Remember, [employer] is NOT to be used for urgent needs. For medical emergencies, dial 911. Now available from your iPhone and Android! Please provide this summary of care documentation to your next provider. Your primary care clinician is listed as Dariela 13. If you have any questions after today's visit, please call [phone].

## 2017-03-17 ENCOUNTER — TELEPHONE (OUTPATIENT)
Dept: FAMILY MEDICINE CLINIC | Age: 37
End: 2017-03-17

## 2017-03-17 NOTE — TELEPHONE ENCOUNTER
Pt called stating her Contrave is causing her to have constipation. She is wondering if she should start taking stool softener.  Please advise    [phone]

## 2017-08-16 DIAGNOSIS — I10 ESSENTIAL HYPERTENSION: ICD-10-CM

## 2017-08-16 RX ORDER — AMLODIPINE AND BENAZEPRIL HYDROCHLORIDE 10; 20 MG/1; MG/1
CAPSULE ORAL
Qty: 90 CAP | Refills: 0 | Status: SHIPPED | OUTPATIENT
Start: 2017-08-16 | End: 2017-10-13 | Stop reason: SDUPTHER

## 2017-10-13 DIAGNOSIS — I10 ESSENTIAL HYPERTENSION: ICD-10-CM

## 2017-10-17 RX ORDER — AMLODIPINE AND BENAZEPRIL HYDROCHLORIDE 10; 20 MG/1; MG/1
CAPSULE ORAL
Qty: 90 CAP | Refills: 0 | Status: SHIPPED | OUTPATIENT
Start: 2017-10-17 | End: 2018-01-22 | Stop reason: SDUPTHER

## 2018-01-22 DIAGNOSIS — I10 ESSENTIAL HYPERTENSION: ICD-10-CM

## 2018-01-23 RX ORDER — TRIAMTERENE AND HYDROCHLOROTHIAZIDE 37.5; 25 MG/1; MG/1
CAPSULE ORAL
Qty: 30 CAP | Refills: 0 | Status: SHIPPED | OUTPATIENT
Start: 2018-01-23 | End: 2018-02-19 | Stop reason: SDUPTHER

## 2018-01-23 RX ORDER — AMLODIPINE AND BENAZEPRIL HYDROCHLORIDE 10; 20 MG/1; MG/1
CAPSULE ORAL
Qty: 30 CAP | Refills: 0 | Status: SHIPPED | OUTPATIENT
Start: 2018-01-23 | End: 2018-02-19 | Stop reason: SDUPTHER

## 2018-02-19 ENCOUNTER — OFFICE VISIT (OUTPATIENT)
Dept: FAMILY MEDICINE CLINIC | Age: 38
End: 2018-02-19

## 2018-02-19 ENCOUNTER — HOSPITAL ENCOUNTER (OUTPATIENT)
Dept: LAB | Age: 38
Discharge: HOME | End: 2018-02-19

## 2018-02-19 VITALS
SYSTOLIC BLOOD PRESSURE: 148 MMHG | RESPIRATION RATE: 20 BRPM | HEART RATE: 113 BPM | HEIGHT: 64 IN | WEIGHT: 225.6 LBS | TEMPERATURE: 98.8 F | DIASTOLIC BLOOD PRESSURE: 88 MMHG | BODY MASS INDEX: 38.51 KG/M2 | OXYGEN SATURATION: 99 %

## 2018-02-19 DIAGNOSIS — Z23 ENCOUNTER FOR IMMUNIZATION: ICD-10-CM

## 2018-02-19 DIAGNOSIS — I10 ESSENTIAL HYPERTENSION: ICD-10-CM

## 2018-02-19 DIAGNOSIS — D64.9 NORMOCYTIC ANEMIA: ICD-10-CM

## 2018-02-19 DIAGNOSIS — R74.01 TRANSAMINITIS: ICD-10-CM

## 2018-02-19 DIAGNOSIS — I10 HYPERTENSION, UNCONTROLLED: Primary | ICD-10-CM

## 2018-02-19 DIAGNOSIS — Z00.00 ROUTINE GENERAL MEDICAL EXAMINATION AT A HEALTH CARE FACILITY: ICD-10-CM

## 2018-02-19 PROBLEM — E66.01 MORBID OBESITY DUE TO EXCESS CALORIES (HCC): Status: RESOLVED | Noted: 2017-02-06 | Resolved: 2018-02-19

## 2018-02-19 PROCEDURE — 99001 SPECIMEN HANDLING PT-LAB: CPT | Performed by: INTERNAL MEDICINE

## 2018-02-19 RX ORDER — TRIAMTERENE AND HYDROCHLOROTHIAZIDE 37.5; 25 MG/1; MG/1
CAPSULE ORAL
Qty: 30 CAP | Refills: 0 | Status: SHIPPED | OUTPATIENT
Start: 2018-02-19 | End: 2018-03-19 | Stop reason: SDUPTHER

## 2018-02-19 RX ORDER — AMLODIPINE AND BENAZEPRIL HYDROCHLORIDE 10; 20 MG/1; MG/1
CAPSULE ORAL
Qty: 30 CAP | Refills: 0 | Status: SHIPPED | OUTPATIENT
Start: 2018-02-19 | End: 2018-03-19 | Stop reason: SDUPTHER

## 2018-02-19 NOTE — MR AVS SNAPSHOT
[address] [place] [address]  [address] [address] [facility] [address] [phone] Patient: Corie Diaz MRN: [id number] :1980 Visit Information Date & Time Provider Department Dept. Phone Encounter #  
 2018  8:30 AM Eddi Jackson, [address] [place] [phone] [id number] Upcoming Health Maintenance Date Due Pneumococcal 19-64 Medium Risk (1 of 1 - PPSV23) 1998 DTaP/Tdap/Td series (1 - Tdap) 2000 Influenza Age 5 to Adult 2017 PAP AKA CERVICAL CYTOLOGY 2019 Allergies as of 2018  Review Complete On: 2018 By: Eddi Jackson MD  
 No Known Allergies Current Immunizations  Never Reviewed Name Date Influenza Vaccine (Quad) PF  Incomplete Pneumococcal Polysaccharide (PPSV-23)  Incomplete Not reviewed this visit You Were Diagnosed With   
  
 Codes Comments Hypertension, uncontrolled    -  Primary ICD-10-CM: I10 
ICD-9-CM: 401.9 Class 2 obesity due to excess calories with serious comorbidity and body mass index (BMI) of 38.0 to 38.9 in adult     ICD-10-CM: E66.09, Z68.38 
ICD-9-CM: 278.00, V85.38 Transaminitis     ICD-10-CM: R74.0 ICD-9-CM: 790.4 Normocytic anemia     ICD-10-CM: D64.9 ICD-9-CM: 285.9 Routine general medical examination at a health care facility     ICD-10-CM: Z00.00 ICD-9-CM: V70.0 Encounter for immunization     ICD-10-CM: V79 ICD-9-CM: V03.89 Essential hypertension     ICD-10-CM: I10 
ICD-9-CM: 401.9 Vitals BP Pulse Temp Resp Height(growth percentile) Weight(growth percentile) 148/88 (BP 1 Location: Left arm, BP Patient Position: Sitting) (!) 113 98.8 °F (37.1 °C) (Oral) 20 5' 4\" (1.626 m) 225 lb 9.6 oz (102.3 kg) LMP SpO2 BMI Smoking Status 2018 99% 38.72 kg/m2 Current Every Day Smoker BMI and BSA Data Body Mass Index Body Surface Area  38.72 kg/m 2 2.15 m 2  
  
  
 Preferred Pharmacy Pharmacy Name Phone [facility] [address], [place] [address] [phone] Your Updated Medication List  
  
   
This list is accurate as of: 2/19/18  8:34 AM.  Always use your most recent med list. amLODIPine-benazepril 10-20 mg per capsule Commonly known as:  LOTREL  
TAKE 1 CAPSULE BY MOUTH DAILY  
  
 triamterene-hydroCHLOROthiazide 37.5-25 mg per capsule Commonly known as:  Deborra Hallieford TAKE 1 CAPSULE BY MOUTH DAILY Prescriptions Sent to Pharmacy Refills  
 amLODIPine-benazepril (LOTREL) 10-20 mg per capsule 0 Sig: TAKE 1 CAPSULE BY MOUTH DAILY Class: Normal  
 Pharmacy: [facility] [address] [facility], [address] [facility] [place] [address] Ph #: [phone]  
 triamterene-hydroCHLOROthiazide (DYAZIDE) 37.5-25 mg per capsule 0 Sig: TAKE 1 CAPSULE BY MOUTH DAILY Class: Normal  
 Pharmacy: [facility] [address] [place] [address] Ph #: [phone] We Performed the Following INFLUENZA VIRUS VAC QUAD,SPLIT,PRESV FREE SYRINGE IM [id number] CPT(R)] PNEUMOCOCCAL POLYSACCHARIDE VACCINE, 23-VALENT, ADULT OR IMMUNOSUPPRESSED PT DOSE, [04717 CPT(R)] NM IMMUNIZ ADMIN,1 SINGLE/COMB VAC/TOXOID [id number] CPT(R)] To-Do List   
 02/19/2018 Lab:  CBC W/O DIFF   
  
 02/19/2018 Lab:  IRON PROFILE   
  
 02/19/2018 Lab:  LIPID PANEL   
  
 02/19/2018 Lab:  METABOLIC PANEL, COMPREHENSIVE   
  
 02/19/2018 Lab:  VITAMIN B12 Introducing [facility] & HEALTH SERVICES! Keaton Freire introduces [employer] patient portal. Now you can access parts of your medical record, email your doctor's office, and request medication refills online. 1. In your internet browser, go to [email or web address] 2. Click on the First Time User? Click Here link in the Sign In box.  You will see the New Member Sign Up page. 3. Enter your [employer] Access Code exactly as it appears below. You will not need to use this code after youve completed the sign-up process. If you do not sign up before the expiration date, you must request a new code. · [employer] Access Code: [email or web address] Expires: 5/20/2018  8:34 AM 
 
4. Enter the last four digits of your Social Security Number (xxxx) and Date of Birth (mm/dd/yyyy) as indicated and click Submit. You will be taken to the next sign-up page. 5. Create a [employer] ID. This will be your [employer] login ID and cannot be changed, so think of one that is secure and easy to remember. 6. Create a [employer] password. You can change your password at any time. 7. Enter your Password Reset Question and Answer. This can be used at a later time if you forget your password. 8. Enter your e-mail address. You will receive e-mail notification when new information is available in [address] 9. Click Sign Up. You can now view and download portions of your medical record. 10. Click the Download Summary menu link to download a portable copy of your medical information. If you have questions, please visit the Frequently Asked Questions section of the [employer] website. Remember, [employer] is NOT to be used for urgent needs. For medical emergencies, dial 911. Now available from your iPhone and Android! Please provide this summary of care documentation to your next provider. Your primary care clinician is listed as Dariela 13. If you have any questions after today's visit, please call [phone].

## 2018-02-19 NOTE — PROGRESS NOTES
Shalonda Alvarado is a 44 y.o. female is here to follow up on blood pressure. 1. Have you been to the ER, urgent care clinic since your last visit? Hospitalized since your last visit? No    2. Have you seen or consulted any other health care providers outside of the [address] [place] [address] since your last visit? Include any pap smears or colon screening. No     Health Maintenance Due   Topic Date Due    Pneumococcal 19-64 Medium Risk (1 of 1 - PPSV23) 11/16/1998    DTaP/Tdap/Td series (1 - Tdap) 11/16/2000    PAP AKA CERVICAL CYTOLOGY  11/16/2000    Influenza Age 5 to Adult  08/01/2017       Per verbal orders of Dr. Almita Nunez, injection of pneumovax 23 and flu shot given by Jaydon Barraza LPN. Patient instructed to remain in clinic for 20 minutes afterwards, and to report any adverse reaction to me immediately. Vaccine documentation completed. Tolerated well. Consent signed.

## 2018-02-19 NOTE — PROGRESS NOTES
Assessment/Plan:    1. Hypertension, uncontrolled  -f/u in 1mo. If remains elevated, will adjust meds. - METABOLIC PANEL, COMPREHENSIVE; Future  - LIPID PANEL; Future    2. Class 2 obesity due to excess calories with serious comorbidity and body mass index (BMI) of 38.0 to 38.9 in adult  -cont to work on wt loss    3. Transaminitis  -likely fatty liver. If remains elevated, will do u/s with elastography  - METABOLIC PANEL, COMPREHENSIVE; Future  - LIPID PANEL; Future    4. Normocytic anemia  - CBC W/O DIFF; Future  - IRON PROFILE; Future  - VITAMIN B12; Future    5. Routine general medical examination at a health care facility  - METABOLIC PANEL, COMPREHENSIVE; Future  - LIPID PANEL; Future  - CBC W/O DIFF; Future    6. Encounter for immunization  - Influenza virus vaccine (QUADRIVALENT PRES FREE SYRINGE) IM ([id number])  - Pneumococcal polysaccharide vaccine, 23-valent, adult or immunosuppressed pt dose  - CA IMMUNIZ ADMIN,1 SINGLE/COMB VAC/TOXOID    The plan was discussed with the patient. The patient verbalized understanding and is in agreement with the plan. All medication potential side effects were discussed with the patient. Health Maintenance:   Health Maintenance   Topic Date Due    Pneumococcal 19-64 Medium Risk (1 of 1 - PPSV23) 11/16/1998    DTaP/Tdap/Td series (1 - Tdap) 11/16/2000    Influenza Age 9 to Adult  08/01/2017    PAP AKA CERVICAL CYTOLOGY  01/01/2019       Wayne Ann is a 44 y.o. female and presents with Blood Pressure Check     Subjective:  HTN - hasn't been seen for almost a year. bp uncontrolled. She states her bp is 130s/70s at work. Last labs show elevated liver enzyme. Obesity - has lost some wt. ROS:  Constitutional: No recent weight change. No weakness/fatigue. No f/c. HENT: No HA, dizziness. No hearing loss/tinnitus. No nasal congestion/discharge. Eyes: No change in vision, double/blurred vision or eye pain/redness.     Cardiovascular: No CP/palpitations. No PAYNE/orthopnea/PND. Respiratory: No cough/sputum, dyspnea, wheezing. Gastointestinal: No dysphagia, reflux. No n/v. No constipation/diarrhea. No melena/rectal bleeding. Genitourinary: No dysuria, urinary hesitancy, nocturia, hematuria. No incontinence. The problem list was updated as a part of today's visit. Patient Active Problem List   Diagnosis Code    Tobacco use Z72.0    Essential hypertension I10    Morbid obesity due to excess calories (HCC) E66.01       The PSH, FH were reviewed. SH:  Social History   Substance Use Topics    Smoking status: Current Every Day Smoker     Packs/day: 0.25     Years: 10.00    Smokeless tobacco: Never Used    Alcohol use 2.4 oz/week     0 Cans of beer, 4 Glasses of wine, 0 Shots of liquor per week       Medications/Allergies:  Current Outpatient Prescriptions on File Prior to Visit   Medication Sig Dispense Refill    amLODIPine-benazepril (LOTREL) 10-20 mg per capsule TAKE 1 CAPSULE BY MOUTH DAILY 30 Cap 0    triamterene-hydroCHLOROthiazide (DYAZIDE) 37.5-25 mg per capsule TAKE 1 CAPSULE BY MOUTH DAILY 30 Cap 0     No current facility-administered medications on file prior to visit. No Known Allergies    Objective:  Visit Vitals    /88 (BP 1 Location: Left arm, BP Patient Position: Sitting)    Pulse (!) 113    Temp 98.8 °F (37.1 °C) (Oral)    Resp 20    Ht 5' 4\" (1.626 m)    Wt 225 lb 9.6 oz (102.3 kg)    LMP 02/05/2018    SpO2 99%    BMI 38.72 kg/m2      Constitutional: Well developed, nourished, no distress, alert, obese habitus   HENT: Exterior ears and tympanic membranes normal bilaterally. Supple neck. No thyromegaly or lymphadenopathy. Oropharynx clear and moist mucous membranes. Eyes: Conjunctiva normal. PERRL. CV: S1, S2.  RRR. No murmurs/rubs. No thrills palpated. No carotid bruits. Intact distal pulses. No edema. Pulm: No abnormalities on inspection. Clear to auscultation bilaterally.  No wheezing/rhonchi. Normal effort.

## 2018-02-20 PROBLEM — D50.9 IRON DEFICIENCY ANEMIA: Status: ACTIVE | Noted: 2018-02-20

## 2018-02-20 LAB
ALBUMIN SERPL-MCNC: 4.2 G/DL (ref 3.5–5.5)
ALBUMIN/GLOB SERPL: 1.3 {RATIO} (ref 1.2–2.2)
ALP SERPL-CCNC: 113 IU/L (ref 39–117)
ALT SERPL-CCNC: 21 IU/L (ref 0–32)
AST SERPL-CCNC: 20 IU/L (ref 0–40)
BILIRUB SERPL-MCNC: 0.2 MG/DL (ref 0–1.2)
BUN SERPL-MCNC: 13 MG/DL (ref 6–20)
BUN/CREAT SERPL: 13 (ref 9–23)
CALCIUM SERPL-MCNC: 9.6 MG/DL (ref 8.7–10.2)
CHLORIDE SERPL-SCNC: 100 MMOL/L (ref 96–106)
CHOLEST SERPL-MCNC: 190 MG/DL (ref 100–199)
CO2 SERPL-SCNC: 25 MMOL/L (ref 18–29)
CREAT SERPL-MCNC: 0.98 MG/DL (ref 0.57–1)
ERYTHROCYTE [DISTWIDTH] IN BLOOD BY AUTOMATED COUNT: 20.3 % (ref 12.3–15.4)
GFR SERPLBLD CREATININE-BSD FMLA CKD-EPI: 73 ML/MIN/1.73
GFR SERPLBLD CREATININE-BSD FMLA CKD-EPI: 85 ML/MIN/1.73
GLOBULIN SER CALC-MCNC: 3.3 G/DL (ref 1.5–4.5)
GLUCOSE SERPL-MCNC: 86 MG/DL (ref 65–99)
HCT VFR BLD AUTO: 33.3 % (ref 34–46.6)
HDLC SERPL-MCNC: 64 MG/DL
HGB BLD-MCNC: 10.1 G/DL (ref 11.1–15.9)
INTERPRETATION, 910389: NORMAL
IRON SATN MFR SERPL: 3 % (ref 15–55)
IRON SERPL-MCNC: 14 UG/DL (ref 27–159)
LDLC SERPL CALC-MCNC: 96 MG/DL (ref 0–99)
MCH RBC QN AUTO: 22 PG (ref 26.6–33)
MCHC RBC AUTO-ENTMCNC: 30.3 G/DL (ref 31.5–35.7)
MCV RBC AUTO: 72 FL (ref 79–97)
PLATELET # BLD AUTO: 398 X10E3/UL (ref 150–379)
POTASSIUM SERPL-SCNC: 4.3 MMOL/L (ref 3.5–5.2)
PROT SERPL-MCNC: 7.5 G/DL (ref 6–8.5)
RBC # BLD AUTO: 4.6 X10E6/UL (ref 3.77–5.28)
SODIUM SERPL-SCNC: 139 MMOL/L (ref 134–144)
TIBC SERPL-MCNC: 476 UG/DL (ref 250–450)
TRIGL SERPL-MCNC: 149 MG/DL (ref 0–149)
UIBC SERPL-MCNC: 462 UG/DL (ref 131–425)
VIT B12 SERPL-MCNC: 405 PG/ML (ref 232–1245)
VLDLC SERPL CALC-MCNC: 30 MG/DL (ref 5–40)
WBC # BLD AUTO: 7.1 X10E3/UL (ref 3.4–10.8)

## 2018-03-19 ENCOUNTER — OFFICE VISIT (OUTPATIENT)
Dept: FAMILY MEDICINE CLINIC | Age: 38
End: 2018-03-19

## 2018-03-19 VITALS
WEIGHT: 228 LBS | RESPIRATION RATE: 20 BRPM | OXYGEN SATURATION: 97 % | HEIGHT: 64 IN | TEMPERATURE: 98.5 F | DIASTOLIC BLOOD PRESSURE: 80 MMHG | HEART RATE: 95 BPM | BODY MASS INDEX: 38.93 KG/M2 | SYSTOLIC BLOOD PRESSURE: 128 MMHG

## 2018-03-19 DIAGNOSIS — I10 ESSENTIAL HYPERTENSION: Primary | ICD-10-CM

## 2018-03-19 DIAGNOSIS — Z23 ENCOUNTER FOR IMMUNIZATION: ICD-10-CM

## 2018-03-19 RX ORDER — AMLODIPINE AND BENAZEPRIL HYDROCHLORIDE 10; 20 MG/1; MG/1
CAPSULE ORAL
Qty: 90 CAP | Refills: 1 | Status: SHIPPED | OUTPATIENT
Start: 2018-03-19 | End: 2018-10-17 | Stop reason: SDUPTHER

## 2018-03-19 RX ORDER — TRIAMTERENE AND HYDROCHLOROTHIAZIDE 37.5; 25 MG/1; MG/1
CAPSULE ORAL
Qty: 90 CAP | Refills: 1 | Status: SHIPPED | OUTPATIENT
Start: 2018-03-19 | End: 2018-10-17 | Stop reason: SDUPTHER

## 2018-03-19 NOTE — PROGRESS NOTES
Tdap Immunization/s administered 3/19/2018 by Tg Oliveira LPN with guardian's consent. Patient tolerated procedure well. No reactions noted.

## 2018-03-19 NOTE — PROGRESS NOTES
Gaurang Yeh is a 44 y.o. female (: 1980) presenting to address:    Chief Complaint   Patient presents with    Hypertension       Vitals:    18 0748   BP: 128/80   Pulse: 95   Resp: 20   Temp: 98.5 °F (36.9 °C)   TempSrc: Oral   SpO2: 97%   Weight: 228 lb (103.4 kg)   Height: 5' 4\" (1.626 m)   PainSc:   0 - No pain   LMP: 2018         Learning Assessment:     Learning Assessment 2017   PRIMARY LEARNER Patient   HIGHEST LEVEL OF EDUCATION - PRIMARY LEARNER  4 YEARS OF COLLEGE   PRIMARY LANGUAGE ENGLISH   LEARNER PREFERENCE PRIMARY DEMONSTRATION     VIDEOS     PICTURES   ANSWERED BY self   RELATIONSHIP SELF     Depression Screening:     PHQ over the last two weeks 3/19/2018   Little interest or pleasure in doing things Not at all   Feeling down, depressed or hopeless Not at all   Total Score PHQ 2 0     Fall Risk Assessment:     Fall Risk Assessment, last 12 mths 3/19/2018   Able to walk? Yes   Fall in past 12 months? No     Abuse Screening:     Abuse Screening Questionnaire 3/19/2018   Do you ever feel afraid of your partner? N   Are you in a relationship with someone who physically or mentally threatens you? N   Is it safe for you to go home? Y     Coordination of Care Questionaire:   1. Have you been to the ER, urgent care clinic since your last visit? Hospitalized since your last visit? NO    2. Have you seen or consulted any other health care providers outside of the [address] [place] [address] since your last visit? Include any pap smears or colon screening. NO    Advanced Directive:   1. Do you have an Advanced Directive? NO    2. Would you like information on Advanced Directives?  YES

## 2018-03-19 NOTE — PROGRESS NOTES
Assessment/Plan:    1. Essential hypertension  -cont current regimen. F/u in 6 mos  - amLODIPine-benazepril (LOTREL) 10-20 mg per capsule; TAKE 1 CAPSULE BY MOUTH DAILY  Dispense: 90 Cap; Refill: 1  - triamterene-hydroCHLOROthiazide (DYAZIDE) 37.5-25 mg per capsule; TAKE 1 CAPSULE BY MOUTH DAILY  Indications: hypertension  Dispense: 90 Cap; Refill: 1    2. Class 2 obesity due to excess calories with serious comorbidity and body mass index (BMI) of 38.0 to 38.9 in adult  -work on diet/wt loss    3. Encounter for immunization  - Tetanus, diphtheria toxoids and acellular pertussis (TDAP) vaccine, in individuals >=7 years, IM  - MT IMMUNIZ ADMIN,1 SINGLE/COMB VAC/TOXOID    The plan was discussed with the patient. The patient verbalized understanding and is in agreement with the plan. All medication potential side effects were discussed with the patient. Health Maintenance:   Health Maintenance   Topic Date Due    DTaP/Tdap/Td series (1 - Tdap) 11/16/2000    PAP AKA CERVICAL CYTOLOGY  01/01/2019    Pneumococcal 19-64 Medium Risk  Completed    Influenza Age 5 to Adult  Completed       Shell Mckeon is a 44 y.o. female and presents with Hypertension     Subjective:  HTN - bp better than previous. ROS:  Constitutional: No recent weight change. No weakness/fatigue. No f/c. Cardiovascular: No CP/palpitations. No PAYNE/orthopnea/PND. Respiratory: No cough/sputum, dyspnea, wheezing. Gastointestinal: No dysphagia, reflux. No n/v. No constipation/diarrhea. No melena/rectal bleeding. The problem list was updated as a part of today's visit. Patient Active Problem List   Diagnosis Code    Tobacco use Z72.0    Essential hypertension I10    Iron deficiency anemia D50.9       The PSH, FH were reviewed.     SH:  Social History   Substance Use Topics    Smoking status: Current Every Day Smoker     Packs/day: 0.25     Years: 10.00    Smokeless tobacco: Never Used    Alcohol use 2.4 oz/week     4 Glasses of wine, 0 Cans of beer, 0 Shots of liquor per week       Medications/Allergies:  Current Outpatient Prescriptions on File Prior to Visit   Medication Sig Dispense Refill    amLODIPine-benazepril (LOTREL) 10-20 mg per capsule TAKE 1 CAPSULE BY MOUTH DAILY 30 Cap 0    triamterene-hydroCHLOROthiazide (DYAZIDE) 37.5-25 mg per capsule TAKE 1 CAPSULE BY MOUTH DAILY 30 Cap 0     No current facility-administered medications on file prior to visit. No Known Allergies    Objective:  Visit Vitals    /80 (BP 1 Location: Left arm, BP Patient Position: Sitting)    Pulse 95    Temp 98.5 °F (36.9 °C) (Oral)    Resp 20    Ht 5' 4\" (1.626 m)    Wt 228 lb (103.4 kg)    LMP 02/19/2018    SpO2 97%    BMI 39.14 kg/m2      Constitutional: Well developed, nourished, no distress, alert, obese habitus   CV: S1, S2.  RRR. No murmurs/rubs. No thrills palpated. No carotid bruits. Intact distal pulses. No edema. Pulm: No abnormalities on inspection. Clear to auscultation bilaterally. No wheezing/rhonchi. Normal effort. GI: Soft, nontender, nondistended. Normal active bowel sounds. Labwork and Ancillary Studies:    CBC w/Diff  Lab Results   Component Value Date/Time    WBC 7.1 02/19/2018 08:59 AM    HGB 10.1 (L) 02/19/2018 08:59 AM    PLATELET 800 (H) 35/41/2545 08:59 AM         Basic Metabolic Profile/LFTs  Lab Results   Component Value Date/Time    Sodium 139 02/19/2018 08:59 AM    Potassium 4.3 02/19/2018 08:59 AM    Chloride 100 02/19/2018 08:59 AM    CO2 25 02/19/2018 08:59 AM    Anion gap 12 02/06/2017 09:31 AM    Glucose 86 02/19/2018 08:59 AM    BUN 13 02/19/2018 08:59 AM    Creatinine 0.98 02/19/2018 08:59 AM    BUN/Creatinine ratio 13 02/19/2018 08:59 AM    GFR est AA 85 02/19/2018 08:59 AM    GFR est non-AA 73 02/19/2018 08:59 AM    Calcium 9.6 02/19/2018 08:59 AM      Lab Results   Component Value Date/Time    ALT (SGPT) 21 02/19/2018 08:59 AM    AST (SGOT) 20 02/19/2018 08:59 AM    Alk. phosphatase 113 02/19/2018 08:59 AM    Bilirubin, total 0.2 02/19/2018 08:59 AM       Cholesterol  Lab Results   Component Value Date/Time    Cholesterol, total 190 02/19/2018 08:59 AM    HDL Cholesterol 64 02/19/2018 08:59 AM    LDL, calculated 96 02/19/2018 08:59 AM    Triglyceride 149 02/19/2018 08:59 AM    CHOL/HDL Ratio 2.9 02/06/2017 09:31 AM

## 2018-03-19 NOTE — PATIENT INSTRUCTIONS
Tdap (Tetanus, Diphtheria, Pertussis) Vaccine: What You Need to Know  Why get vaccinated? Tetanus, diphtheria, and pertussis are very serious diseases. Tdap vaccine can protect us from these diseases. And Tdap vaccine given to pregnant women can protect  babies against pertussis. Tetanus (lockjaw) is rare in the [facility] today. It causes painful muscle tightening and stiffness, usually all over the body. · It can lead to tightening of muscles in the head and neck so you can't open your mouth, swallow, or sometimes even breathe. Tetanus kills about 1 out of 10 people who are infected even after receiving the best medical care. Diphtheria is also rare in the [place] today. It can cause a thick coating to form in the back of the throat. · It can lead to breathing problems, heart failure, paralysis, and death. Pertussis (whooping cough) causes severe coughing spells, which can cause difficulty breathing, vomiting, and disturbed sleep. · It can also lead to weight loss, incontinence, and rib fractures. Up to 2 in 100 adolescents and 5 in 100 adults with pertussis are hospitalized or have complications, which could include pneumonia or death. These diseases are caused by bacteria. Diphtheria and pertussis are spread from person to person through secretions from coughing or sneezing. Tetanus enters the body through cuts, scratches, or wounds. Before vaccines, as many as 200,000 cases of diphtheria, 200,000 cases of pertussis, and hundreds of cases of tetanus were reported in the [place] each year. Since vaccination began, reports of cases for tetanus and diphtheria have dropped by about 99% and for pertussis by about 80%. Tdap vaccine  The Tdap vaccine can protect adolescents and adults from tetanus, diphtheria, and pertussis. One dose of Tdap is routinely given at age 6 or 15. People who did not get Tdap at that age should get it as soon as possible.   Tdap is especially important for health care professionals and anyone having close contact with a baby younger than 12 months. Pregnant women should get a dose of Tdap during every pregnancy, to protect the  from pertussis. Infants are most at risk for severe, life-threatening complications from pertussis. Another vaccine, called Td, protects against tetanus and diphtheria, but not pertussis. A Td booster should be given every 10 years. Tdap may be given as one of these boosters if you have never gotten Tdap before. Tdap may also be given after a severe cut or burn to prevent tetanus infection. Your doctor or the person giving you the vaccine can give you more information. Tdap may safely be given at the same time as other vaccines. Some people should not get this vaccine  · A person who has ever had a life-threatening allergic reaction after a previous dose of any diphtheria-, tetanus-, or pertussis-containing vaccine, OR has a severe allergy to any part of this vaccine, should not get Tdap vaccine. Tell the person giving the vaccine about any severe allergies. · Anyone who had coma or long repeated seizures within 7 days after a childhood dose of DTP or DTaP, or a previous dose of Tdap, should not get Tdap, unless a cause other than the vaccine was found. They can still get Td. · Talk to your doctor if you:  ¨ Have seizures or another nervous system problem. ¨ Had severe pain or swelling after any vaccine containing diphtheria, tetanus, or pertussis. ¨ Ever had a condition called Guillain-Barré Syndrome (GBS). ¨ Aren't feeling well on the day the shot is scheduled. Risks  With any medicine, including vaccines, there is a chance of side effects. These are usually mild and go away on their own. Serious reactions are also possible but are rare. Most people who get Tdap vaccine do not have any problems with it.   Mild problems following Tdap  (Did not interfere with activities)  · Pain where the shot was given (about 3 in 4 adolescents or 2 in 3 adults)  · Redness or swelling where the shot was given (about 1 person in 5)  · Mild fever of at least 100.4°F (up to about 1 in 25 adolescents or 1 in 100 adults)  · Headache (about 3 or 4 people in 10)  · Tiredness (about 1 person in 3 or 4)  · Nausea, vomiting, diarrhea, stomachache (up to 1 in 4 adolescents or 1 in 10 adults)  · Chills, sore joints (about 1 person in 10)  · Body aches (about 1 person in 3 or 4)  · Rash, swollen glands (uncommon)  Moderate problems following Tdap  (Interfered with activities, but did not require medical attention)  · Pain where the shot was given (up to 1 in 5 or 6)  · Redness or swelling where the shot was given (up to about 1 in 16 adolescents or 1 in 12 adults)  · Fever over 102°F (about 1 in 100 adolescents or 1 in 250 adults)  · Headache (about 1 in 7 adolescents or 1 in 10 adults)  · Nausea, vomiting, diarrhea, stomachache (up to 1 to 3 people in 100)  · Swelling of the entire arm where the shot was given (up to about 1 in 500)  Severe problems following Tdap  (Unable to perform usual activities; required medical attention)  · Swelling, severe pain, bleeding and redness in the arm where the shot was given (rare)  Problems that could happen after any vaccine:  · People sometimes faint after a medical procedure, including vaccination. Sitting or lying down for about 15 minutes can help prevent fainting, and injuries caused by a fall. Tell your doctor if you feel dizzy or have vision changes or ringing in the ears. · Some people get severe pain in the shoulder and have difficulty moving the arm where a shot was given. This happens very rarely. · Any medication can cause a severe allergic reaction. Such reactions from a vaccine are very rare, estimated at fewer than 1 in a million doses, and would happen within a few minutes to a few hours after the vaccination.   As with any medicine, there is a very remote chance of a vaccine causing a serious injury or death. The safety of vaccines is always being monitored. For more information, visit: www.cdc.gov/vaccinesafety. What if there is a serious problem? What should I look for? · Look for anything that concerns you, such as signs of a severe allergic reaction, very high fever, or unusual behavior. Signs of a severe allergic reaction can include hives, swelling of the face and throat, difficulty breathing, a fast heartbeat, dizziness, and weakness. These would usually start a few minutes to a few hours after the vaccination. What should I do? · If you think it is a severe allergic reaction or other emergency that can't wait, call 9-1-1 or get the person to the nearest hospital. Otherwise, call your doctor. · Afterward, the reaction should be reported to the Vaccine Adverse Event Reporting System (VAERS). Your doctor might file this report, or you can do it yourself through the VAERS web site at www.vaers. [facility].gov, or by calling 1-666.984.4845. VAERS does not give medical advice. The National Vaccine Injury Compensation Program  The National Vaccine Injury Compensation Program (VICP) is a federal program that was created to compensate people who may have been injured by certain vaccines. Persons who believe they may have been injured by a vaccine can learn about the program and about filing a claim by calling 9-652.497.4018 or visiting the [employer] website at www.[facility].gov/vaccinecompensation. There is a time limit to file a claim for compensation. How can I learn more? · Ask your doctor. He or she can give you the vaccine package insert or suggest other sources of information. · Call your local or state health department. · Contact the Centers for Disease Control and Prevention (CDC):  ¨ Call 9-382.546.1220 (1-800-CDC-INFO) or  ¨ Visit CDC's website at www.cdc.gov/vaccines  Vaccine Information Statement (Interim)  Tdap Vaccine  (2/24/15)  42 [address] [id number]  [facility] for Disease Control and Prevention  Many Vaccine Information Statements are available in [place] and other languages. See www.immunize.org/vis. Muchas hojas de información sobre vacunas están disponibles en español y en otros idiomas. Visite www.immunize.org/vis. Care instructions adapted under license by [employer] (which disclaims liability or warranty for this information). If you have questions about a medical condition or this instruction, always ask your healthcare professional. [address] any warranty or liability for your use of this information.

## 2018-03-19 NOTE — MR AVS SNAPSHOT
[address] [place] [address] 
 
 
 [address] [facility] [id number] [phone] Patient: Chase Staton MRN: [id number] :1980 Visit Information Date & Time Provider Department Dept. Phone Encounter #  
 3/19/2018  8:30 AM Bianca aLu rumr: turn off the lights [phone] [id number] Your Appointments 3/19/2018  8:30 AM  
Follow Up with Bianca Lau MD  
[facility] Appt Note: 1 month f/u appt [address] [facility] [address] [phone]  
  
   
 [address] Upcoming Health Maintenance Date Due DTaP/Tdap/Td series (1 - Tdap) 2000 PAP AKA CERVICAL CYTOLOGY 2019 Allergies as of 3/19/2018  Review Complete On: 3/19/2018 By: William Ludwig No Known Allergies Current Immunizations  Reviewed on 2018 Name Date Influenza Vaccine (Quad) PF 2018  8:35 AM  
 Pneumococcal Polysaccharide (PPSV-23) 2018  8:36 AM  
 Tdap  Incomplete Not reviewed this visit You Were Diagnosed With   
  
 Codes Comments Essential hypertension    -  Primary ICD-10-CM: I10 
ICD-9-CM: 401.9 Class 2 obesity due to excess calories with serious comorbidity and body mass index (BMI) of 38.0 to 38.9 in adult     ICD-10-CM: E66.09, Z68.38 
ICD-9-CM: 278.00, V85.38 Encounter for immunization     ICD-10-CM: D53 ICD-9-CM: V03.89 Vitals BP Pulse Temp Resp Height(growth percentile) Weight(growth percentile) 128/80 (BP 1 Location: Left arm, BP Patient Position: Sitting) 95 98.5 °F (36.9 °C) (Oral) 20 5' 4\" (1.626 m) 228 lb (103.4 kg) LMP SpO2 BMI OB Status Smoking Status 2018 97% 39.14 kg/m2 Having regular periods Current Every Day Smoker Vitals History BMI and BSA Data  Body Mass Index Body Surface Area  
 39.14 kg/m 2 2.16 m 2  
  
  
 Preferred Pharmacy Pharmacy Name Phone [facility] [address] [facility] [id number] [place] [address] [phone] Your Updated Medication List  
  
   
This list is accurate as of 3/19/18  8:03 AM.  Always use your most recent med list. amLODIPine-benazepril 10-20 mg per capsule Commonly known as:  LOTREL  
TAKE 1 CAPSULE BY MOUTH DAILY  
  
 triamterene-hydroCHLOROthiazide 37.5-25 mg per capsule Commonly known as:  Diana Crow TAKE 1 CAPSULE BY MOUTH DAILY  Indications: hypertension Prescriptions Sent to Pharmacy Refills  
 amLODIPine-benazepril (LOTREL) 10-20 mg per capsule 1 Sig: TAKE 1 CAPSULE BY MOUTH DAILY Class: Normal  
 Pharmacy: [facility] [address] [facility], [address] [facility] AT [place] [address] Ph #: [phone]  
 triamterene-hydroCHLOROthiazide (DYAZIDE) 37.5-25 mg per capsule 1 Sig: TAKE 1 CAPSULE BY MOUTH DAILY  Indications: hypertension Class: Normal  
 Pharmacy: [facility] [place] [address] Ph #: [phone] We Performed the Following [place] IMMUNIZ ADMIN,1 SINGLE/COMB VAC/TOXOID [id number] CPT(R)] TETANUS, DIPHTHERIA TOXOIDS AND ACELLULAR PERTUSSIS VACCINE (TDAP), IN INDIVIDS. >=7, IM [id number] CPT(R)] Patient Instructions Tdap (Tetanus, Diphtheria, Pertussis) Vaccine: What You Need to Know Why get vaccinated? Tetanus, diphtheria, and pertussis are very serious diseases. Tdap vaccine can protect us from these diseases. And Tdap vaccine given to pregnant women can protect  babies against pertussis. Tetanus (lockjaw) is rare in the [facility] today. It causes painful muscle tightening and stiffness, usually all over the body. · It can lead to tightening of muscles in the head and neck so you can't open your mouth, swallow, or sometimes even breathe.  Tetanus kills about 1 out of 10 people who are infected even after receiving the best medical care. Diphtheria is also rare in the [place] today. It can cause a thick coating to form in the back of the throat. · It can lead to breathing problems, heart failure, paralysis, and death. Pertussis (whooping cough) causes severe coughing spells, which can cause difficulty breathing, vomiting, and disturbed sleep. · It can also lead to weight loss, incontinence, and rib fractures. Up to 2 in 100 adolescents and 5 in 100 adults with pertussis are hospitalized or have complications, which could include pneumonia or death. These diseases are caused by bacteria. Diphtheria and pertussis are spread from person to person through secretions from coughing or sneezing. Tetanus enters the body through cuts, scratches, or wounds. Before vaccines, as many as 200,000 cases of diphtheria, 200,000 cases of pertussis, and hundreds of cases of tetanus were reported in the [place] each year. Since vaccination began, reports of cases for tetanus and diphtheria have dropped by about 99% and for pertussis by about 80%. Tdap vaccine The Tdap vaccine can protect adolescents and adults from tetanus, diphtheria, and pertussis. One dose of Tdap is routinely given at age 6 or 15. People who did not get Tdap at that age should get it as soon as possible. Tdap is especially important for health care professionals and anyone having close contact with a baby younger than 12 months. Pregnant women should get a dose of Tdap during every pregnancy, to protect the  from pertussis. Infants are most at risk for severe, life-threatening complications from pertussis. Another vaccine, called Td, protects against tetanus and diphtheria, but not pertussis. A Td booster should be given every 10 years. Tdap may be given as one of these boosters if you have never gotten Tdap before.  Tdap may also be given after a severe cut or burn to prevent tetanus infection. Your doctor or the person giving you the vaccine can give you more information. Tdap may safely be given at the same time as other vaccines. Some people should not get this vaccine · A person who has ever had a life-threatening allergic reaction after a previous dose of any diphtheria-, tetanus-, or pertussis-containing vaccine, OR has a severe allergy to any part of this vaccine, should not get Tdap vaccine. Tell the person giving the vaccine about any severe allergies. · Anyone who had coma or long repeated seizures within 7 days after a childhood dose of DTP or DTaP, or a previous dose of Tdap, should not get Tdap, unless a cause other than the vaccine was found. They can still get Td. · Talk to your doctor if you: 
¨ Have seizures or another nervous system problem. ¨ Had severe pain or swelling after any vaccine containing diphtheria, tetanus, or pertussis. ¨ Ever had a condition called Guillain-Barré Syndrome (GBS). ¨ Aren't feeling well on the day the shot is scheduled. Risks With any medicine, including vaccines, there is a chance of side effects. These are usually mild and go away on their own. Serious reactions are also possible but are rare. Most people who get Tdap vaccine do not have any problems with it. Mild problems following Tdap 
(Did not interfere with activities) · Pain where the shot was given (about 3 in 4 adolescents or 2 in 3 adults) · Redness or swelling where the shot was given (about 1 person in 5) · Mild fever of at least 100.4°F (up to about 1 in 25 adolescents or 1 in 100 adults) · Headache (about 3 or 4 people in 10) · Tiredness (about 1 person in 3 or 4) · Nausea, vomiting, diarrhea, stomachache (up to 1 in 4 adolescents or 1 in 10 adults) · Chills, sore joints (about 1 person in 10) · Body aches (about 1 person in 3 or 4) · Rash, swollen glands (uncommon) Moderate problems following Tdap (Interfered with activities, but did not require medical attention) · Pain where the shot was given (up to 1 in 5 or 6) · Redness or swelling where the shot was given (up to about 1 in 16 adolescents or 1 in 12 adults) · Fever over 102°F (about 1 in 100 adolescents or 1 in 250 adults) · Headache (about 1 in 7 adolescents or 1 in 10 adults) · Nausea, vomiting, diarrhea, stomachache (up to 1 to 3 people in 100) · Swelling of the entire arm where the shot was given (up to about 1 in 500) Severe problems following Tdap 
(Unable to perform usual activities; required medical attention) · Swelling, severe pain, bleeding and redness in the arm where the shot was given (rare) Problems that could happen after any vaccine: · People sometimes faint after a medical procedure, including vaccination. Sitting or lying down for about 15 minutes can help prevent fainting, and injuries caused by a fall. Tell your doctor if you feel dizzy or have vision changes or ringing in the ears. · Some people get severe pain in the shoulder and have difficulty moving the arm where a shot was given. This happens very rarely. · Any medication can cause a severe allergic reaction. Such reactions from a vaccine are very rare, estimated at fewer than 1 in a million doses, and would happen within a few minutes to a few hours after the vaccination. As with any medicine, there is a very remote chance of a vaccine causing a serious injury or death. The safety of vaccines is always being monitored. For more information, visit: www.cdc.gov/vaccinesafety. What if there is a serious problem? What should I look for? · Look for anything that concerns you, such as signs of a severe allergic reaction, very high fever, or unusual behavior.  
Signs of a severe allergic reaction can include hives, swelling of the face and throat, difficulty breathing, a fast heartbeat, dizziness, and weakness. These would usually start a few minutes to a few hours after the vaccination. What should I do? · If you think it is a severe allergic reaction or other emergency that can't wait, call 9-1-1 or get the person to the nearest hospital. Otherwise, call your doctor. · Afterward, the reaction should be reported to the Vaccine Adverse Event Reporting System (VAERS). Your doctor might file this report, or you can do it yourself through the VAERS web site at www.vaers. [facility].gov, or by calling 8-310.867.1185. VAERS does not give medical advice. The National Vaccine Injury Compensation Program 
The National Vaccine Injury Compensation Program (VICP) is a federal program that was created to compensate people who may have been injured by certain vaccines. Persons who believe they may have been injured by a vaccine can learn about the program and about filing a claim by calling 6-537.157.1029 or visiting the [employer] website at www.[facility].gov/vaccinecompensation. There is a time limit to file a claim for compensation. How can I learn more? · Ask your doctor. He or she can give you the vaccine package insert or suggest other sources of information. · Call your local or state health department. · Contact the Centers for Disease Control and Prevention (CDC): 
¨ Call 5-712.549.3293 (1-800-CDC-INFO) or ¨ Visit CDC's website at www.cdc.gov/vaccines Vaccine Information Statement (Interim) Tdap Vaccine 
(2/24/15) [address] [address] [facility] [employer] Centers for Disease Control and Prevention Many Vaccine Information Statements are available in Georgian and other languages. See www.immunize.org/vis. Muchas hojas de información sobre vacunas están disponibles en español y en otros idiomas. Visite www.immunize.org/vis. Care instructions adapted under license by [employer] (which disclaims liability or warranty for this information).  If you have questions about a medical condition or this instruction, always ask your healthcare professional. [address] any warranty or liability for your use of this information. Introducing \[facility]\"" & HEALTH SERVICES! Salvador Balderrama introduces [employer] patient portal. Now you can access parts of your medical record, email your doctor's office, and request medication refills online. 1. In your internet browser, go to [email or web address] 2. Click on the First Time User? Click Here link in the Sign In box. You will see the New Member Sign Up page. 3. Enter your [employer] Access Code exactly as it appears below. You will not need to use this code after youve completed the sign-up process. If you do not sign up before the expiration date, you must request a new code. · [employer] [place] Access Code: [email or web address] Expires: 5/20/2018  9:34 AM 
 
4. Enter the last four digits of your Social Security Number (xxxx) and Date of Birth (mm/dd/yyyy) as indicated and click Submit. You will be taken to the next sign-up page. 5. Create a [employer] [place] ID. This will be your [employer] [place] login ID and cannot be changed, so think of one that is secure and easy to remember. 6. Create a [employer] password. You can change your password at any time. 7. Enter your Password Reset Question and Answer. This can be used at a later time if you forget your password. 8. Enter your e-mail address. You will receive e-mail notification when new information is available in [address]. 9. Click Sign Up. You can now view and download portions of your medical record. 10. Click the Download Summary menu link to download a portable copy of your medical information. If you have questions, please visit the Frequently Asked Questions section of the [employer] website. Remember, [employer] is NOT to be used for urgent needs. For medical emergencies, dial 911. Now available from your iPhone and Android! Please provide this summary of care documentation to your next provider. Your primary care clinician is listed as Dariela Isidro. If you have any questions after today's visit, please call [phone].

## 2018-10-17 ENCOUNTER — OFFICE VISIT (OUTPATIENT)
Dept: FAMILY MEDICINE CLINIC | Age: 38
End: 2018-10-17

## 2018-10-17 VITALS
SYSTOLIC BLOOD PRESSURE: 140 MMHG | WEIGHT: 235.2 LBS | HEART RATE: 100 BPM | HEIGHT: 64 IN | RESPIRATION RATE: 20 BRPM | BODY MASS INDEX: 40.15 KG/M2 | TEMPERATURE: 98.6 F | OXYGEN SATURATION: 98 % | DIASTOLIC BLOOD PRESSURE: 89 MMHG

## 2018-10-17 DIAGNOSIS — D50.9 IRON DEFICIENCY ANEMIA, UNSPECIFIED IRON DEFICIENCY ANEMIA TYPE: Primary | ICD-10-CM

## 2018-10-17 DIAGNOSIS — I10 ESSENTIAL HYPERTENSION: ICD-10-CM

## 2018-10-17 PROBLEM — E66.01 OBESITY, MORBID (HCC): Status: ACTIVE | Noted: 2018-10-17

## 2018-10-17 RX ORDER — AMLODIPINE AND BENAZEPRIL HYDROCHLORIDE 10; 20 MG/1; MG/1
CAPSULE ORAL
Qty: 90 CAP | Refills: 1 | Status: SHIPPED | OUTPATIENT
Start: 2018-10-17 | End: 2019-05-19 | Stop reason: SDUPTHER

## 2018-10-17 RX ORDER — TRIAMTERENE AND HYDROCHLOROTHIAZIDE 37.5; 25 MG/1; MG/1
CAPSULE ORAL
Qty: 90 CAP | Refills: 1 | Status: SHIPPED | OUTPATIENT
Start: 2018-10-17 | End: 2019-05-19 | Stop reason: SDUPTHER

## 2018-10-17 NOTE — PROGRESS NOTES
Assessment/Plan: 1. Essential hypertension 
-CONT CURRENT. F/u in 2/2019 for labs. - triamterene-hydroCHLOROthiazide (DYAZIDE) 37.5-25 mg per capsule; TAKE 1 CAPSULE BY MOUTH DAILY  Dispense: 90 Cap; Refill: 1 
- amLODIPine-benazepril (LOTREL) 10-20 mg per capsule; TAKE 1 CAPSULE BY MOUTH DAILY  Dispense: 90 Cap; Refill: 1 
 
2. Iron def anemia - on oral iron. Ck labs 2/2019 The plan was discussed with the patient. The patient verbalized understanding and is in agreement with the plan. All medication potential side effects were discussed with the patient. Health Maintenance:  
Health Maintenance Topic Date Due  Influenza Age 5 to Adult  08/01/2018  PAP AKA CERVICAL CYTOLOGY  01/01/2019  
 DTaP/Tdap/Td series (3 - Td) 03/19/2028  Pneumococcal 19-64 Medium Risk  Completed Vamshi Herrera is a 44 y.o. female and presents with Hypertension Subjective: HTN- here for bp followup. She rushed to get here and literally just took her meds. Iron def anemia - taking oral iron. Due for labs 2/2019. ROS: 
Constitutional: No recent weight change. No weakness/fatigue. No f/c. Cardiovascular: No CP/palpitations. No PAYNE/orthopnea/PND. Respiratory: No cough/sputum, dyspnea, wheezing. Gastointestinal: No dysphagia, reflux. No n/v. No constipation/diarrhea. No melena/rectal bleeding. The problem list was updated as a part of today's visit. Patient Active Problem List  
Diagnosis Code  Tobacco use Z72.0  
 Essential hypertension I10  
 Iron deficiency anemia D50.9  Class 2 obesity due to excess calories with serious comorbidity and body mass index (BMI) of 38.0 to 38.9 in adult [id number] The PSH, FH were reviewed. SH: Social History Tobacco Use  Smoking status: Current Every Day Smoker Packs/day: 0.25 Years: 10.00 Pack years: 2.50  Smokeless tobacco: Never Used Substance Use Topics  Alcohol use: Yes   Alcohol/week: 2.4 oz  
 Types: 4 Glasses of wine per week  Drug use: No  
 
 
Medications/Allergies: 
Current Outpatient Medications on File Prior to Visit Medication Sig Dispense Refill  amLODIPine-benazepril (LOTREL) 10-20 mg per capsule TAKE 1 CAPSULE BY MOUTH DAILY 90 Cap 1  
 triamterene-hydroCHLOROthiazide (DYAZIDE) 37.5-25 mg per capsule TAKE 1 CAPSULE BY MOUTH DAILY  Indications: hypertension 90 Cap 1 No current facility-administered medications on file prior to visit. No Known Allergies Objective: 
Visit Vitals /89 (BP 1 Location: Right arm, BP Patient Position: Sitting) Pulse 100 Temp 98.6 °F (37 °C) (Oral) Resp 20 Ht 5' 4\" (1.626 m) Wt 235 lb 3.2 oz (106.7 kg) LMP 10/16/2018 (Exact Date) SpO2 98% BMI 40.37 kg/m² Constitutional: Well developed, nourished, no distress, alert, obese habitus CV: S1, S2.  RRR. No murmurs/rubs. No thrills palpated. No carotid bruits. Intact distal pulses. No edema. No aortic bruits. Pulm: No abnormalities on inspection. Clear to auscultation bilaterally. No wheezing/rhonchi. Normal effort.

## 2018-10-17 NOTE — PROGRESS NOTES
Rebeca Cook is a 44 y.o. female (: 1980) presenting to address: Chief Complaint Patient presents with  Hypertension Vitals:  
 10/17/18 0750 BP: 140/89 Pulse: 100 Resp: 20 Temp: 98.6 °F (37 °C) TempSrc: Oral  
SpO2: 98% Weight: 235 lb 3.2 oz (106.7 kg) Height: 5' 4\" (1.626 m) PainSc:   0 - No pain LMP: 10/16/2018 Learning Assessment:  
 
Learning Assessment 2017 PRIMARY LEARNER Patient HIGHEST LEVEL OF EDUCATION - PRIMARY LEARNER  [address] [place] [address] PRIMARY LANGUAGE ENGLISH  
LEARNER PREFERENCE PRIMARY DEMONSTRATION  
  VIDEOS  
  PICTURES  
ANSWERED BY self RELATIONSHIP SELF Depression Screening: PHQ over the last two weeks 10/17/2018 Little interest or pleasure in doing things Not at all Feeling down, depressed, irritable, or hopeless Not at all Total Score PHQ 2 0 Fall Risk Assessment:  
 
Fall Risk Assessment, last 12 mths 10/17/2018 Able to walk? Yes Fall in past 12 months? No  
 
Abuse Screening:  
 
Abuse Screening Questionnaire 10/17/2018 Do you ever feel afraid of your partner? Belen Jiang Are you in a relationship with someone who physically or mentally threatens you? Belen Jiang Is it safe for you to go home? Adair Ovalles Coordination of Care Questionaire: 1. Have you been to the ER, urgent care clinic since your last visit? Hospitalized since your last visit? NO 
 
2. Have you seen or consulted any other health care providers outside of the [address] [place] [address] since your last visit? Include any pap smears or colon screening. NO Advanced Directive: 1. Do you have an Advanced Directive? NO 
 
2. Would you like information on Advanced Directives?  YES

## 2018-11-01 ENCOUNTER — OFFICE VISIT (OUTPATIENT)
Dept: FAMILY MEDICINE CLINIC | Age: 38
End: 2018-11-01

## 2018-11-01 VITALS
TEMPERATURE: 98.5 F | OXYGEN SATURATION: 99 % | BODY MASS INDEX: 40.29 KG/M2 | HEART RATE: 110 BPM | WEIGHT: 236 LBS | DIASTOLIC BLOOD PRESSURE: 90 MMHG | SYSTOLIC BLOOD PRESSURE: 140 MMHG | RESPIRATION RATE: 20 BRPM | HEIGHT: 64 IN

## 2018-11-01 DIAGNOSIS — J45.21 MILD INTERMITTENT REACTIVE AIRWAY DISEASE WITH WHEEZING WITH ACUTE EXACERBATION: ICD-10-CM

## 2018-11-01 DIAGNOSIS — R68.89 FLU-LIKE SYMPTOMS: Primary | ICD-10-CM

## 2018-11-01 LAB
FLUAV+FLUBV AG NOSE QL IA.RAPID: NEGATIVE POS/NEG
FLUAV+FLUBV AG NOSE QL IA.RAPID: NEGATIVE POS/NEG
VALID INTERNAL CONTROL?: YES

## 2018-11-01 RX ORDER — BENZONATATE 100 MG/1
200 CAPSULE ORAL
Qty: 30 CAP | Refills: 0 | Status: SHIPPED | OUTPATIENT
Start: 2018-11-01 | End: 2019-08-13 | Stop reason: ALTCHOICE

## 2018-11-01 RX ORDER — ALBUTEROL SULFATE 90 UG/1
2 AEROSOL, METERED RESPIRATORY (INHALATION)
Qty: 1 INHALER | Refills: 0 | Status: SHIPPED | OUTPATIENT
Start: 2018-11-01 | End: 2019-09-10 | Stop reason: SDUPTHER

## 2018-11-01 NOTE — PATIENT INSTRUCTIONS
Albuterol (By mouth) Albuterol (al-BUE-ter-ol) Treats bronchospasm. Brand Name(s):  
There may be other brand names for this medicine. When This Medicine Should Not Be Used: This medicine is not right for everyone. Do not use it if you had an allergic reaction to albuterol. How to Use This Medicine:  
Tablet, Long Acting Tablet, Liquid · Your doctor will tell you how much of this medicine to use. Do not use more medicine or use it more often than your doctor tells you to. · Measure the oral liquid medicine with a marked measuring spoon, oral syringe, or medicine cup. · Swallow the extended-release tablet whole. Do not crush, break, or chew it. · If you take the extended-release tablet, part of the tablet may pass into your stools. This is normal and is nothing to worry about. · Albuterol is sometimes used with other medicines, such as medicine that you inhale. Use all other medicines your doctor has prescribed as part of your combination treatment. · Missed dose: Take a dose as soon as you remember. If it is almost time for your next dose, wait until then and take a regular dose. Do not take extra medicine to make up for a missed dose. · Store Proventil® and Ventolin® at room temperature in a closed container, away from heat, moisture, and direct light. Store Volmax® in the refrigerator. Do not freeze. Drugs and Foods to Avoid: Ask your doctor or pharmacist before using any other medicine, including over-the-counter medicines, vitamins, and herbal products. · Some medicines can affect how albuterol works. Tell your doctor if you are taking any of the following: ¨ A diuretic (water pill) ¨ An MAO inhibitor (MAOI) or depression medicine within the past 14 days ¨ Blood pressure medicine ¨ Digoxin Warnings While Using This Medicine: · Tell your doctor if you are pregnant or breastfeeding, or if you have heart disease, high blood pressure, heart rhythm problems, seizures, thyroid problems, or diabetes. · Call your doctor if your symptoms do not improve or if they get worse. · Keep all medicine out of the reach of children. Never share your medicine with anyone. Possible Side Effects While Using This Medicine:  
Call your doctor right away if you notice any of these side effects: · Allergic reaction: Itching or hives, swelling in your face or hands, swelling or tingling in your mouth or throat, chest tightness, trouble breathing · Blistering, peeling, red skin rash · Chest pain · Fast, pounding, or uneven heartbeat · Muscle cramps, nausea, vomiting If you notice these less serious side effects, talk with your doctor: · Dry mouth or throat · Shakiness, restlessness, nervousness, excitement, or trouble sleeping If you notice other side effects that you think are caused by this medicine, tell your doctor. Call your doctor for medical advice about side effects. You may report side effects to FDA at 1-641-FDA-4413 © 2017 [address] Information is for End User's use only and may not be sold, redistributed or otherwise used for commercial purposes. The above information is an  only. It is not intended as medical advice for individual conditions or treatments. Talk to your doctor, nurse or pharmacist before following any medical regimen to see if it is safe and effective for you.

## 2018-11-01 NOTE — PROGRESS NOTES
Rebeca Cook is a 44 y.o. female (: 1980) presenting to address: Chief Complaint Patient presents with  Cough  Nasal Congestion Vitals:  
 18 1031 BP: 140/90 Pulse: (!) 110 Resp: 20 Temp: 98.5 °F (36.9 °C) TempSrc: Oral  
SpO2: 99% Weight: 236 lb (107 kg) Height: 5' 4\" (1.626 m) PainSc:   2 PainLoc: Back LMP: 10/16/2018 Learning Assessment:  
 
Learning Assessment 2017 PRIMARY LEARNER Patient HIGHEST LEVEL OF EDUCATION - PRIMARY LEARNER  [address] [place] [address] PRIMARY LANGUAGE ENGLISH  
LEARNER PREFERENCE PRIMARY DEMONSTRATION  
  VIDEOS  
  PICTURES  
ANSWERED BY self RELATIONSHIP SELF Depression Screening: PHQ over the last two weeks 10/17/2018 Little interest or pleasure in doing things Not at all Feeling down, depressed, irritable, or hopeless Not at all Total Score PHQ 2 0 Fall Risk Assessment:  
 
Fall Risk Assessment, last 12 mths 10/17/2018 Able to walk? Yes Fall in past 12 months? No  
 
Abuse Screening:  
 
Abuse Screening Questionnaire 10/17/2018 Do you ever feel afraid of your partner? Belen Jiang Are you in a relationship with someone who physically or mentally threatens you? Belen Jiang Is it safe for you to go home? Adair Ovalles Coordination of Care Questionaire: 1. Have you been to the ER, urgent care clinic since your last visit? Hospitalized since your last visit? NO 
 
2. Have you seen or consulted any other health care providers outside of the [address] [place] [address] since your last visit? Include any pap smears or colon screening. NO Advanced Directive: 1. Do you have an Advanced Directive? YES 
 
2. Would you like information on Advanced Directives?  NO

## 2018-11-01 NOTE — LETTER
NOTIFICATION RETURN TO WORK / SCHOOL 
 
11/1/2018 11:06 AM 
 
Ms. Deonna Randall 
[address] [facility] [address] To Whom It May Concern: 
 
Deonna Randall is currently under the care of [address] [place] [address]. She will return to work/school on: 11/5/18. If there are questions or concerns please have the patient contact our office. Sincerely, Ross Bermeo MD

## 2018-11-01 NOTE — PROGRESS NOTES
Chief Complaint Patient presents with  Cough  Nasal Congestion Assessment/Plan 1. Flu-like symptoms 
-flu neg. Likely viral.  If no better in 4-5 days,call office. 
- AMB POC SHANNON INFLUENZA A/B TEST 
- benzonatate (TESSALON) 100 mg capsule; Take 2 Caps by mouth three (3) times daily as needed for Cough. Dispense: 30 Cap; Refill: 0 
 
2. Mild intermittent reactive airway disease with wheezing with acute exacerbation 
- albuterol (PROVENTIL HFA, VENTOLIN HFA, PROAIR HFA) 90 mcg/actuation inhaler; Take 2 Puffs by inhalation every four (4) hours as needed for Wheezing. Dispense: 1 Inhaler; Refill: 0 The plan was discussed with the patient. The patient verbalized understanding and is in agreement with the plan. All medication potential side effects were discussed with the patient. SUBJECTIVE:  
Yadira Zarate is a 44 y.o. female who complains of nasal congestion,wheezing, productive cough x 2days. No f/c.  +myalgias in back Review of Systems - ENT ROS: positive for - nasal congestion Respiratory ROS: positive for - cough and wheezing Cardiovascular ROS: negative Gastrointestinal ROS: no abdominal pain, change in bowel habits, or black or bloody stools Musculoskeletal ROS: negative Neurological ROS: negative Physical Examination:  
Visit Vitals /90 (BP 1 Location: Right arm, BP Patient Position: Sitting) Pulse (!) 110 Temp 98.5 °F (36.9 °C) (Oral) Resp 20 Ht 5' 4\" (1.626 m) Wt 236 lb (107 kg) LMP 10/16/2018 (Exact Date) SpO2 99% BMI 40.51 kg/m² Constitutional: Well developed, nourished, no distress, alert HENT: Exterior ears and tympanic membranes normal bilaterally. Supple neck. No thyromegaly or lymphadenopathy. Oropharynx clear and moist mucous membranes. Eyes: Conjunctiva normal. PERRL. CV: S1, S2.  Reg, tachy. No murmurs/rubs. No thrills palpated. No carotid bruits. Intact distal pulses. No edema. Pulm: No abnormalities on inspection. Clear to auscultation bilaterally. No wheezing/rhonchi. Normal effort.     
 
 
 
 
Good Pérez MD

## 2019-07-15 DIAGNOSIS — I10 ESSENTIAL HYPERTENSION: ICD-10-CM

## 2019-07-15 NOTE — TELEPHONE ENCOUNTER
Requested Prescriptions     Pending Prescriptions Disp Refills    triamterene-hydroCHLOROthiazide (DYAZIDE) 37.5-25 mg per capsule 30 Cap 0     Sig: Indications: high blood pressure    amLODIPine-benazepril (LOTREL) 10-20 mg per capsule 30 Cap 0     Patient calling to request refill on:      Remaining pills:  Last appt:  Next appt: Future Appointments   Date Time Provider Les Roca   2019  2:45 PM Radha Rao MD [address] [place] [address],Third Floor:  [employer]    Patient aware of 72 hour time frame.

## 2019-07-17 NOTE — TELEPHONE ENCOUNTER
Pt appt 7/29/19. I asked her to get her labs done before the appt. She will call back tomorrow to see when her next morning is she can come to us for labs.

## 2019-07-18 RX ORDER — AMLODIPINE AND BENAZEPRIL HYDROCHLORIDE 10; 20 MG/1; MG/1
CAPSULE ORAL
Qty: 30 CAP | Refills: 0 | Status: SHIPPED | OUTPATIENT
Start: 2019-07-18 | End: 2019-08-13 | Stop reason: SDUPTHER

## 2019-07-18 RX ORDER — TRIAMTERENE AND HYDROCHLOROTHIAZIDE 37.5; 25 MG/1; MG/1
CAPSULE ORAL
Qty: 30 CAP | Refills: 0 | Status: SHIPPED | OUTPATIENT
Start: 2019-07-18 | End: 2019-08-13 | Stop reason: SDUPTHER

## 2019-08-13 ENCOUNTER — OFFICE VISIT (OUTPATIENT)
Dept: FAMILY MEDICINE CLINIC | Age: 39
End: 2019-08-13

## 2019-08-13 VITALS
WEIGHT: 238.6 LBS | HEIGHT: 64 IN | OXYGEN SATURATION: 100 % | DIASTOLIC BLOOD PRESSURE: 80 MMHG | BODY MASS INDEX: 40.74 KG/M2 | TEMPERATURE: 98.6 F | HEART RATE: 129 BPM | RESPIRATION RATE: 20 BRPM | SYSTOLIC BLOOD PRESSURE: 138 MMHG

## 2019-08-13 DIAGNOSIS — I10 ESSENTIAL HYPERTENSION: Primary | ICD-10-CM

## 2019-08-13 DIAGNOSIS — R00.0 TACHYCARDIA: ICD-10-CM

## 2019-08-13 RX ORDER — METOPROLOL SUCCINATE 25 MG/1
25 TABLET, EXTENDED RELEASE ORAL DAILY
Qty: 90 TAB | Refills: 0 | Status: SHIPPED | OUTPATIENT
Start: 2019-08-13 | End: 2019-10-14 | Stop reason: SDUPTHER

## 2019-08-13 RX ORDER — AMLODIPINE AND BENAZEPRIL HYDROCHLORIDE 10; 20 MG/1; MG/1
CAPSULE ORAL
Qty: 90 CAP | Refills: 0 | Status: SHIPPED | OUTPATIENT
Start: 2019-08-13 | End: 2019-10-14 | Stop reason: SDUPTHER

## 2019-08-13 RX ORDER — TRIAMTERENE AND HYDROCHLOROTHIAZIDE 37.5; 25 MG/1; MG/1
CAPSULE ORAL
Qty: 90 CAP | Refills: 0 | Status: SHIPPED | OUTPATIENT
Start: 2019-08-13 | End: 2019-10-14 | Stop reason: SDUPTHER

## 2019-08-13 NOTE — PATIENT INSTRUCTIONS
[facility] [address], [address] [facility], [address] 
[phone]

## 2019-08-13 NOTE — PROGRESS NOTES
Yelena Payan is a 43 y.o. female (: 1980) presenting to address:    Chief Complaint   Patient presents with    Hypertension       Vitals:    19 0912   BP: 138/80   Pulse: (!) 129   Resp: 20   Temp: 98.6 °F (37 °C)   SpO2: 100%   Weight: 238 lb 9.6 oz (108.2 kg)   Height: 5' 4\" (1.626 m)   PainSc:   0 - No pain   LMP: 2019       Learning Assessment:     Learning Assessment 2017   PRIMARY LEARNER Patient   HIGHEST LEVEL OF EDUCATION - PRIMARY LEARNER  4 YEARS OF COLLEGE   PRIMARY LANGUAGE ENGLISH   LEARNER PREFERENCE PRIMARY DEMONSTRATION     VIDEOS     PICTURES   ANSWERED BY self   RELATIONSHIP SELF     Depression Screening:     3 most recent PHQ Screens 2019   Little interest or pleasure in doing things Not at all   Feeling down, depressed, irritable, or hopeless Not at all   Total Score PHQ 2 0     Fall Risk Assessment:     Fall Risk Assessment, last 12 mths 2019   Able to walk? Yes   Fall in past 12 months? No     Abuse Screening:     Abuse Screening Questionnaire 2019   Do you ever feel afraid of your partner? N   Are you in a relationship with someone who physically or mentally threatens you? N   Is it safe for you to go home? Y     Coordination of Care Questionaire:   1. Have you been to the ER, urgent care clinic since your last visit? Hospitalized since your last visit? NO    2. Have you seen or consulted any other health care providers outside of the [address] [place] [address] since your last visit? Include any pap smears or colon screening. NO    Advanced Directive:   1. Do you have an Advanced Directive? NO    2. Would you like information on Advanced Directives?  YES

## 2019-08-13 NOTE — PROGRESS NOTES
Assessment/Plan:    1. Essential hypertension  -add metoprolol to help with #2. F/u in 6 weeks. - metoprolol succinate (TOPROL-XL) 25 mg XL tablet; Take 1 Tab by mouth daily. Dispense: 90 Tab; Refill: 0  - triamterene-hydroCHLOROthiazide (DYAZIDE) 37.5-25 mg per capsule; Take one capsule by mouth once per day. Indications: high blood pressure  Dispense: 90 Cap; Refill: 0  - amLODIPine-benazepril (LOTREL) 10-20 mg per capsule; Take one capsule by mouth per day  Dispense: 90 Cap; Refill: 0    2. Tachycardia  -sinus tachy on ekg  - AMB POC EKG ROUTINE W/ 12 LEADS, INTER & REP      The plan was discussed with the patient. The patient verbalized understanding and is in agreement with the plan. All medication potential side effects were discussed with the patient. Health Maintenance:   Health Maintenance   Topic Date Due    PAP AKA CERVICAL CYTOLOGY  01/01/2019    Influenza Age 5 to Adult  08/01/2019    DTaP/Tdap/Td series (3 - Td) 03/19/2028    Pneumococcal 0-64 years  Completed       Maddie Del Castillo is a 43 y.o. female and presents with Hypertension     Subjective:    htn - bp running 130s/high 80s at work. Has h/o tachycardia. Denies palpitations. She is compliant with meds. ROS:  Constitutional: No recent weight change. No weakness/fatigue. No f/c. HENT: No HA, dizziness. No hearing loss/tinnitus. No nasal congestion/discharge. Eyes: No change in vision, double/blurred vision or eye pain/redness. Cardiovascular: No CP/palpitations. No PAYNE/orthopnea/PND. Respiratory: No cough/sputum, dyspnea, wheezing. Neurological: No seizures/numbness/weakness. No paresthesias. Psychiatric:  No depression, anxiety. The problem list was updated as a part of today's visit.   Patient Active Problem List   Diagnosis Code    Tobacco use Z72.0    Essential hypertension I10    Iron deficiency anemia D50.9    Class 2 obesity due to excess calories with serious comorbidity and body mass index (BMI) of 38.0 to 38.9 in adult [id number]    Obesity, morbid (HCC) E66.01       The PSH,  were reviewed. SH:  Social History     Tobacco Use    Smoking status: Current Every Day Smoker     Packs/day: 0.25     Years: 10.00     Pack years: 2.50    Smokeless tobacco: Never Used   Substance Use Topics    Alcohol use: Yes     Alcohol/week: 4.0 standard drinks     Types: 4 Glasses of wine per week    Drug use: No       Medications/Allergies:  Current Outpatient Medications on File Prior to Visit   Medication Sig Dispense Refill    triamterene-hydroCHLOROthiazide (DYAZIDE) 37.5-25 mg per capsule Take one capsule by mouth once per day. Indications: high blood pressure 30 Cap 0    amLODIPine-benazepril (LOTREL) 10-20 mg per capsule Take one capsule by mouth per day 30 Cap 0    albuterol (PROVENTIL HFA, VENTOLIN HFA, PROAIR HFA) 90 mcg/actuation inhaler Take 2 Puffs by inhalation every four (4) hours as needed for Wheezing. 1 Inhaler 0    benzonatate (TESSALON) 100 mg capsule Take 2 Caps by mouth three (3) times daily as needed for Cough. 30 Cap 0     No current facility-administered medications on file prior to visit. No Known Allergies    Objective:  Visit Vitals  /80 (BP 1 Location: Right arm, BP Patient Position: Sitting)   Pulse (!) 129   Temp 98.6 °F (37 °C)   Resp 20   Ht 5' 4\" (1.626 m)   Wt 238 lb 9.6 oz (108.2 kg)   LMP 07/30/2019   SpO2 100%   BMI 40.96 kg/m²      Constitutional: Well developed, nourished, no distress, alert, obese habitus   CV: S1, S2.  Reg, tachy. No murmurs/rubs. No edema. Pulm: No abnormalities on inspection. Clear to auscultation bilaterally. No wheezing/rhonchi. Normal effort. Psych: Appropriate affect, judgement and insight. Short-term memory intact.      EKG: sinus tachycardia, no ST changes, no sign of ischemia

## 2019-09-10 DIAGNOSIS — J45.21 MILD INTERMITTENT REACTIVE AIRWAY DISEASE WITH WHEEZING WITH ACUTE EXACERBATION: ICD-10-CM

## 2019-09-10 RX ORDER — ALBUTEROL SULFATE 90 UG/1
AEROSOL, METERED RESPIRATORY (INHALATION)
Qty: 8.5 G | Refills: 0 | Status: SHIPPED | OUTPATIENT
Start: 2019-09-10

## 2019-10-14 ENCOUNTER — OFFICE VISIT (OUTPATIENT)
Dept: FAMILY MEDICINE CLINIC | Age: 39
End: 2019-10-14

## 2019-10-14 ENCOUNTER — HOSPITAL ENCOUNTER (OUTPATIENT)
Dept: LAB | Age: 39
Discharge: HOME | End: 2019-10-14
Payer: COMMERCIAL

## 2019-10-14 VITALS
WEIGHT: 237 LBS | HEART RATE: 129 BPM | HEIGHT: 64 IN | TEMPERATURE: 98.1 F | DIASTOLIC BLOOD PRESSURE: 91 MMHG | OXYGEN SATURATION: 100 % | SYSTOLIC BLOOD PRESSURE: 146 MMHG | BODY MASS INDEX: 40.46 KG/M2 | RESPIRATION RATE: 12 BRPM

## 2019-10-14 DIAGNOSIS — I10 ESSENTIAL HYPERTENSION: ICD-10-CM

## 2019-10-14 DIAGNOSIS — L83 ACANTHOSIS: ICD-10-CM

## 2019-10-14 DIAGNOSIS — B36.0 TINEA VERSICOLOR: Primary | ICD-10-CM

## 2019-10-14 LAB
ALBUMIN SERPL-MCNC: 3.7 G/DL (ref 3.4–5)
ALBUMIN/GLOB SERPL: 0.9 {RATIO} (ref 0.8–1.7)
ALP SERPL-CCNC: 123 U/L (ref 45–117)
ALT SERPL-CCNC: 92 U/L (ref 13–56)
ANION GAP SERPL CALC-SCNC: 8 MMOL/L (ref 3–18)
AST SERPL-CCNC: 109 U/L (ref 10–38)
BILIRUB SERPL-MCNC: 0.4 MG/DL (ref 0.2–1)
BUN SERPL-MCNC: 12 MG/DL (ref 7–18)
BUN/CREAT SERPL: 12 (ref 12–20)
CALCIUM SERPL-MCNC: 9.6 MG/DL (ref 8.5–10.1)
CHLORIDE SERPL-SCNC: 102 MMOL/L (ref 100–111)
CO2 SERPL-SCNC: 29 MMOL/L (ref 21–32)
CREAT SERPL-MCNC: 0.98 MG/DL (ref 0.6–1.3)
ERYTHROCYTE [DISTWIDTH] IN BLOOD BY AUTOMATED COUNT: 17.7 % (ref 11.6–14.5)
GLOBULIN SER CALC-MCNC: 4.1 G/DL (ref 2–4)
GLUCOSE SERPL-MCNC: 88 MG/DL (ref 74–99)
HBA1C MFR BLD: 5.6 % (ref 4.2–5.6)
HCT VFR BLD AUTO: 39.7 % (ref 35–45)
HGB BLD-MCNC: 12.6 G/DL (ref 12–16)
MCH RBC QN AUTO: 27 PG (ref 24–34)
MCHC RBC AUTO-ENTMCNC: 31.7 G/DL (ref 31–37)
MCV RBC AUTO: 85.2 FL (ref 74–97)
PLATELET # BLD AUTO: 413 K/UL (ref 135–420)
PMV BLD AUTO: 9.5 FL (ref 9.2–11.8)
POTASSIUM SERPL-SCNC: 3.7 MMOL/L (ref 3.5–5.5)
PROT SERPL-MCNC: 7.8 G/DL (ref 6.4–8.2)
RBC # BLD AUTO: 4.66 M/UL (ref 4.2–5.3)
SODIUM SERPL-SCNC: 139 MMOL/L (ref 136–145)
WBC # BLD AUTO: 8.7 K/UL (ref 4.6–13.2)

## 2019-10-14 PROCEDURE — 36415 COLL VENOUS BLD VENIPUNCTURE: CPT

## 2019-10-14 PROCEDURE — 80061 LIPID PANEL: CPT

## 2019-10-14 PROCEDURE — 83036 HEMOGLOBIN GLYCOSYLATED A1C: CPT

## 2019-10-14 PROCEDURE — 80053 COMPREHEN METABOLIC PANEL: CPT

## 2019-10-14 PROCEDURE — 85027 COMPLETE CBC AUTOMATED: CPT

## 2019-10-14 RX ORDER — CLOTRIMAZOLE AND BETAMETHASONE DIPROPIONATE 10; .64 MG/G; MG/G
CREAM TOPICAL
Qty: 15 G | Refills: 0 | Status: SHIPPED | OUTPATIENT
Start: 2019-10-14 | End: 2020-07-10 | Stop reason: ALTCHOICE

## 2019-10-14 RX ORDER — AMLODIPINE AND BENAZEPRIL HYDROCHLORIDE 10; 20 MG/1; MG/1
CAPSULE ORAL
Qty: 90 CAP | Refills: 0 | Status: SHIPPED | OUTPATIENT
Start: 2019-10-14 | End: 2020-03-13

## 2019-10-14 RX ORDER — METOPROLOL SUCCINATE 50 MG/1
50 TABLET, EXTENDED RELEASE ORAL DAILY
Qty: 90 TAB | Refills: 0 | Status: SHIPPED | OUTPATIENT
Start: 2019-10-14 | End: 2020-02-04

## 2019-10-14 RX ORDER — TRIAMTERENE AND HYDROCHLOROTHIAZIDE 37.5; 25 MG/1; MG/1
CAPSULE ORAL
Qty: 90 CAP | Refills: 0 | Status: SHIPPED | OUTPATIENT
Start: 2019-10-14 | End: 2020-03-13

## 2019-10-14 NOTE — PROGRESS NOTES
ROOM # 12    Marianne Martinez presents today for   Chief Complaint   Patient presents with    Hypertension     Visit Vitals  BP (!) 146/91   Pulse (!) 129   Temp 98.1 °F (36.7 °C) (Oral)   Resp 12   Ht 5' 4\" (1.626 m)   Wt 237 lb (107.5 kg)   SpO2 100%   BMI 40.68 kg/m²       Marianne Martinez preferred language for health care discussion is english/other. Is someone accompanying this pt? Yes her     Is the patient using any DME equipment during OV? no    Depression Screening:  3 most recent [facility] Screens 10/14/2019 8/13/2019 10/17/2018 3/19/2018 3/13/2017 2/6/2017   Little interest or pleasure in doing things Not at all Not at all Not at all Not at all Not at all Not at all   Feeling down, depressed, irritable, or hopeless Not at all Not at all Not at all Not at all Not at all Not at all   Total Score PHQ 2 0 0 0 0 0 0       Learning Assessment:  Learning Assessment 2/6/2017   PRIMARY LEARNER Patient   HIGHEST LEVEL OF EDUCATION - PRIMARY LEARNER  4 [id number] [facility]   ANSWERED BY self   RELATIONSHIP SELF       Abuse Screening:  Abuse Screening Questionnaire 8/13/2019 10/17/2018 3/19/2018   Do you ever feel afraid of your partner? N N N   Are you in a relationship with someone who physically or mentally threatens you? N N N   Is it safe for you to go home? Shabbir Leon       Fall Risk  Fall Risk Assessment, last 12 mths 8/13/2019 10/17/2018 3/19/2018   Able to walk? Yes Yes Yes   Fall in past 12 months? No No No       Health Maintenance reviewed and discussed per provider. Yes    Marianne Martinez is due for   Health Maintenance Due   Topic Date Due    PAP AKA CERVICAL CYTOLOGY  01/01/2019    Influenza Age 5 to Adult  08/01/2019         Please order/place referral if appropriate. Advance Directive:  1. Do you have an advance directive in place? Patient Reply: no    2.  If not, would you like material regarding how to put one in place? Patient Reply: no    Coordination of Care:  1. Have you been to the ER, urgent care clinic since your last visit? Hospitalized since your last visit? no    2. Have you seen or consulted any other health care providers outside of the [address] [place] [address] since your last visit? Include any pap smears or colon screening.  no

## 2019-10-14 NOTE — PATIENT INSTRUCTIONS
Learning About the [facility] Diet What is the Mediterranean diet? The Mediterranean diet is a style of eating rather than a diet plan. It features foods eaten in [place], [place], [place] and [place], and other countries along the [facility]. It emphasizes eating foods like fish, fruits, vegetables, beans, high-fiber breads and whole grains, nuts, and olive oil. This style of eating includes limited red meat, cheese, and sweets. Why choose the Mediterranean diet? A Mediterranean-style diet may improve heart health. It contains more fat than other heart-healthy diets. But the fats are mainly from nuts, unsaturated oils (such as fish oils and olive oil), and certain nut or seed oils (such as canola, soybean, or flaxseed oil). These fats may help protect the heart and blood vessels. How can you get started on the Mediterranean diet? Here are some things you can do to switch to a more Mediterranean way of eating. What to eat · Eat a variety of fruits and vegetables each day, such as grapes, blueberries, tomatoes, broccoli, peppers, figs, olives, spinach, eggplant, beans, lentils, and chickpeas. · Eat a variety of whole-grain foods each day, such as oats, brown rice, and whole wheat bread, pasta, and couscous. · Eat fish at least 2 times a week. Try tuna, salmon, mackerel, lake trout, herring, or sardines. · Eat moderate amounts of low-fat dairy products, such as milk, cheese, or yogurt. · Eat moderate amounts of poultry and eggs. · Choose healthy (unsaturated) fats, such as nuts, olive oil, and certain nut or seed oils like canola, soybean, and flaxseed. · Limit unhealthy (saturated) fats, such as butter, palm oil, and coconut oil. And limit fats found in animal products, such as meat and dairy products made with whole milk. Try to eat red meat only a few times a month in very small amounts. · Limit sweets and desserts to only a few times a week.  This includes sugar-sweetened drinks like soda. The Mediterranean diet may also include red wine with your meal1 glass each day for women and up to 2 glasses a day for men. Tips for eating at home · Use herbs, spices, garlic, lemon zest, and citrus juice instead of salt to add flavor to foods. · Add avocado slices to your sandwich instead of pulliam. · Have fish for lunch or dinner instead of red meat. Brush the fish with olive oil, and broil or grill it. · Sprinkle your salad with seeds or nuts instead of cheese. · Cook with olive or canola oil instead of butter or oils that are high in saturated fat. · Switch from 2% milk or whole milk to 1% or fat-free milk. · Dip raw vegetables in a vinaigrette dressing or hummus instead of dips made from mayonnaise or sour cream. 
· Have a piece of fruit for dessert instead of a piece of cake. Try baked apples, or have some dried fruit. Tips for eating out · Try broiled, grilled, baked, or poached fish instead of having it fried or breaded. · Ask your  to have your meals prepared with olive oil instead of butter. · Order dishes made with marinara sauce or sauces made from olive oil. Avoid sauces made from cream or mayonnaise. · Choose whole-grain breads, whole wheat pasta and pizza crust, brown rice, beans, and lentils. · Cut back on butter or margarine on bread. Instead, you can dip your bread in a small amount of olive oil. · Ask for a side salad or grilled vegetables instead of french fries or chips. Where can you learn more? Go to http://landen-marcia.info/. Enter 492-877-6996 in the search box to learn more about \"Learning About the Mediterranean Diet. \" Current as of: November 7, 2018 Content Version: 12.2 © 4591-3859 [employer]. Care instructions adapted under license by [employer] (which disclaims liability or warranty for this information).  If you have questions about a medical condition or this instruction, always ask your healthcare professional. [place] [address] any warranty or liability for your use of this information.

## 2019-10-14 NOTE — PROGRESS NOTES
Assessment/Plan:    1. Essential hypertension  -incr metoprolol. F/u in 1mo. - amLODIPine-benazepril (LOTREL) 10-20 mg per capsule; Take one capsule by mouth per day  Dispense: 90 Cap; Refill: 0  - metoprolol succinate (TOPROL-XL) 50 mg XL tablet; Take 1 Tab by mouth daily. Dispense: 90 Tab; Refill: 0  - triamterene-hydroCHLOROthiazide (DYAZIDE) 37.5-25 mg per capsule; Take one capsule by mouth once per day. Indications: high blood pressure  Dispense: 90 Cap; Refill: 0    2. Tinea versicolor- lotrisone    The plan was discussed with the patient. The patient verbalized understanding and is in agreement with the plan. All medication potential side effects were discussed with the patient. Health Maintenance:   Health Maintenance   Topic Date Due    PAP AKA CERVICAL CYTOLOGY  01/01/2019    Influenza Age 5 to Adult  08/01/2019    DTaP/Tdap/Td series (3 - Td) 03/19/2028    Pneumococcal 0-64 years  Completed       Genevieve Gamez is a 43 y.o. female and presents with Hypertension     Subjective:  HTN - metoprolol added last month. Pt still has sinus tachy and bp unchanged. Overdue for labs. Pt notes hypopigmented patch on forehead. Started as a dry scaling patch. It's spreading. ROS:  Constitutional: No recent weight change. No weakness/fatigue. No f/c. Cardiovascular: No CP/palpitations. No PAYNE/orthopnea/PND. Respiratory: No cough/sputum, dyspnea, wheezing. Gastointestinal: No dysphagia, reflux. No n/v. No constipation/diarrhea. No melena/rectal bleeding. The problem list was updated as a part of today's visit. Patient Active Problem List   Diagnosis Code    Tobacco use Z72.0    Essential hypertension I10    Iron deficiency anemia D50.9    Class 2 obesity due to excess calories with serious comorbidity and body mass index (BMI) of 38.0 to 38.9 in adult [id number]    Obesity, morbid (HCC) E66.01       The PSH, FH were reviewed.     SH:  Social History     Tobacco Use    Smoking status: Current Every Day Smoker     Packs/day: 0.25     Years: 10.00     Pack years: 2.50    Smokeless tobacco: Never Used   Substance Use Topics    Alcohol use: Yes     Alcohol/week: 4.0 standard drinks     Types: 4 Glasses of wine per week    Drug use: No       Medications/Allergies:  Current Outpatient Medications on File Prior to Visit   Medication Sig Dispense Refill    albuterol (PROVENTIL HFA, VENTOLIN HFA, PROAIR HFA) 90 mcg/actuation inhaler INHALE 2 PUFFS BY MOUTH EVERY 4 HOURS AS NEEDED FOR WHEEZING 8.5 g 0    metoprolol succinate (TOPROL-XL) 25 mg XL tablet Take 1 Tab by mouth daily. 90 Tab 0    triamterene-hydroCHLOROthiazide (DYAZIDE) 37.5-25 mg per capsule Take one capsule by mouth once per day. Indications: high blood pressure 90 Cap 0    amLODIPine-benazepril (LOTREL) 10-20 mg per capsule Take one capsule by mouth per day 90 Cap 0     No current facility-administered medications on file prior to visit. No Known Allergies    Objective:  Visit Vitals  BP (!) 146/91   Pulse (!) 129   Temp 98.1 °F (36.7 °C) (Oral)   Resp 12   Ht 5' 4\" (1.626 m)   Wt 237 lb (107.5 kg)   LMP 10/04/2019   SpO2 100%   BMI 40.68 kg/m²      Constitutional: Well developed, nourished, no distress, alert, obese habitus   CV: S1, S2.  Reg, tachy. No murmurs/rubs. No edema. Pulm: No abnormalities on inspection. Clear to auscultation bilaterally. No wheezing/rhonchi. Normal effort. GI: Soft, nontender, nondistended. Normal active bowel sounds.     +acanthosis  +forehead with hypopigmented patch and scaling in eyebrows

## 2019-10-15 LAB
CHOLEST SERPL-MCNC: 224 MG/DL
HDLC SERPL-MCNC: 69 MG/DL (ref 40–60)
HDLC SERPL: 3.2 {RATIO} (ref 0–5)
LDLC SERPL CALC-MCNC: 117.8 MG/DL (ref 0–100)
LIPID PROFILE,FLP: ABNORMAL
TRIGL SERPL-MCNC: 186 MG/DL (ref ?–150)
VLDLC SERPL CALC-MCNC: 37.2 MG/DL

## 2019-10-16 DIAGNOSIS — R74.01 TRANSAMINITIS: Primary | ICD-10-CM

## 2019-10-16 NOTE — PROGRESS NOTES
Spoke to pt re: recent lab results and MD recommendations. Pt verbalized understanding and had no questions at this time. She will return for labs in 2-3 weeks.

## 2020-02-04 DIAGNOSIS — I10 ESSENTIAL HYPERTENSION: ICD-10-CM

## 2020-02-04 RX ORDER — METOPROLOL SUCCINATE 50 MG/1
TABLET, EXTENDED RELEASE ORAL
Qty: 90 TAB | Refills: 0 | Status: SHIPPED | OUTPATIENT
Start: 2020-02-04 | End: 2020-05-26

## 2020-03-13 DIAGNOSIS — I10 ESSENTIAL HYPERTENSION: ICD-10-CM

## 2020-03-13 RX ORDER — TRIAMTERENE AND HYDROCHLOROTHIAZIDE 37.5; 25 MG/1; MG/1
CAPSULE ORAL
Qty: 90 CAP | Refills: 0 | Status: SHIPPED | OUTPATIENT
Start: 2020-03-13 | End: 2020-07-10 | Stop reason: SDUPTHER

## 2020-03-13 RX ORDER — AMLODIPINE AND BENAZEPRIL HYDROCHLORIDE 10; 20 MG/1; MG/1
CAPSULE ORAL
Qty: 90 CAP | Refills: 0 | Status: SHIPPED | OUTPATIENT
Start: 2020-03-13 | End: 2020-07-10 | Stop reason: SDUPTHER

## 2020-05-22 DIAGNOSIS — I10 ESSENTIAL HYPERTENSION: ICD-10-CM

## 2020-05-26 RX ORDER — METOPROLOL SUCCINATE 50 MG/1
TABLET, EXTENDED RELEASE ORAL
Qty: 90 TAB | Refills: 0 | Status: SHIPPED | OUTPATIENT
Start: 2020-05-26 | End: 2020-07-10 | Stop reason: SDUPTHER

## 2020-07-10 ENCOUNTER — VIRTUAL VISIT (OUTPATIENT)
Dept: FAMILY MEDICINE CLINIC | Age: 40
End: 2020-07-10

## 2020-07-10 DIAGNOSIS — I10 ESSENTIAL HYPERTENSION: ICD-10-CM

## 2020-07-10 RX ORDER — AMLODIPINE AND BENAZEPRIL HYDROCHLORIDE 10; 20 MG/1; MG/1
CAPSULE ORAL
Qty: 90 CAP | Refills: 1 | Status: SHIPPED | OUTPATIENT
Start: 2020-07-10

## 2020-07-10 RX ORDER — TRIAMTERENE AND HYDROCHLOROTHIAZIDE 37.5; 25 MG/1; MG/1
CAPSULE ORAL
Qty: 90 CAP | Refills: 1 | Status: SHIPPED | OUTPATIENT
Start: 2020-07-10

## 2020-07-10 RX ORDER — METOPROLOL SUCCINATE 50 MG/1
TABLET, EXTENDED RELEASE ORAL
Qty: 90 TAB | Refills: 1 | Status: SHIPPED | OUTPATIENT
Start: 2020-07-10 | End: 2020-08-14

## 2020-07-10 NOTE — PROGRESS NOTES
Danielle Marquez is a 35 y.o. female who was seen by synchronous (real-time) audio-video technology on 7/10/2020 for Hypertension      Assessment & Plan:   Diagnoses and all orders for this visit:    1. Essential hypertension- cont current. If meds too expensive w/o insurance she will call office.  -     metoprolol succinate (TOPROL-XL) 50 mg XL tablet; TAKE 1 TABLET BY MOUTH DAILY  -     amLODIPine-benazepril (LOTREL) 10-20 mg per capsule; TAKE 1 CAPSULE BY MOUTH EVERY DAY  -     triamterene-hydroCHLOROthiazide (DYAZIDE) 37.5-25 mg per capsule; TAKE 1 CAPSULE BY MOUTH EVERY DAY FOR HIGH BLOOD PRESSURE        Subjective:   htn - bp in office 136/78. No side effects from meds. Has no insurance. Prior to Admission medications    Medication Sig Start Date End Date Taking? Authorizing Provider   metoprolol succinate (TOPROL-XL) 50 mg XL tablet TAKE 1 TABLET BY MOUTH DAILY 5/26/20   Hanh Arndt MD   amLODIPine-benazepril (LOTREL) 10-20 mg per capsule TAKE 1 CAPSULE BY MOUTH EVERY DAY 3/13/20   Hanh Arndt MD   triamterene-hydroCHLOROthiazide (DYAZIDE) 37.5-25 mg per capsule TAKE 1 CAPSULE BY MOUTH EVERY DAY FOR HIGH BLOOD PRESSURE 3/13/20   Hanh Arndt MD   clotrimazole-betamethasone (LOTRISONE) topical cream Apply bid to face x 7d. Pea-sized amount 10/14/19   Yajaira Hickman MD   albuterol (PROVENTIL HFA, VENTOLIN HFA, PROAIR HFA) 90 mcg/actuation inhaler INHALE 2 PUFFS BY MOUTH EVERY 4 HOURS AS NEEDED FOR WHEEZING 9/10/19   Hanh Arndt MD     Patient Active Problem List   Diagnosis Code    Tobacco use Z72.0    Essential hypertension I10    Iron deficiency anemia D50.9    Class 2 obesity due to excess calories with serious comorbidity and body mass index (BMI) of 38.0 to 38.9 in adult [id number]    Obesity, morbid ([facility] [address] E66.01       Review of Systems   Respiratory: Negative for cough. Cardiovascular: Negative for chest pain. Objective:   No flowsheet data found.      [INSTRUCTIONS:  \"[x]\" Indicates a positive item  \"[]\" Indicates a negative item  -- DELETE ALL ITEMS NOT EXAMINED]    Constitutional: [x] Appears well-developed and well-nourished [x] No apparent distress      [] Abnormal -     Mental status: [x] Alert and awake  [x] Oriented to person/place/time [x] Able to follow commands    [] Abnormal -     Eyes:   EOM    [x]  Normal    [] Abnormal -   Sclera  [x]  Normal    [] Abnormal -          Discharge [x]  None visible   [] Abnormal -     HENT: [x] Normocephalic, atraumatic  [] Abnormal -   [x] Mouth/Throat: Mucous membranes are moist    External Ears [x] Normal  [] Abnormal -    Neck: [x] No visualized mass [] Abnormal -     Pulmonary/Chest: [x] Respiratory effort normal   [x] No visualized signs of difficulty breathing or respiratory distress        [] Abnormal -      Musculoskeletal:   [] Normal gait with no signs of ataxia         [] Normal range of motion of neck        [] Abnormal -     Neurological:        [] No Facial Asymmetry (Cranial nerve 7 motor function) (limited exam due to video visit)          [] No gaze palsy        [] Abnormal -          Skin:        [] No significant exanthematous lesions or discoloration noted on facial skin         [] Abnormal -            Psychiatric:       [x] Normal Affect [] Abnormal -        [x] No Hallucinations    Other pertinent observable physical exam findings:-        We discussed the expected course, resolution and complications of the diagnosis(es) in detail. Medication risks, benefits, costs, interactions, and alternatives were discussed as indicated. I advised her to contact the office if her condition worsens, changes or fails to improve as anticipated. She expressed understanding with the diagnosis(es) and plan.        Ignacio Rodriguez, who was evaluated through a patient-initiated, synchronous (real-time) audio-video encounter, and/or her healthcare decision maker, is aware that it is a billable service, with coverage as determined by her insurance carrier. She provided verbal consent to proceed: Yes, and patient identification was verified. It was conducted pursuant to the emergency declaration under the [address] [place] [address] and the [employer] and [employer] Act. A caregiver was present when appropriate. Ability to conduct physical exam was limited. I was at home. The patient was at home.       Юлия Rendon MD

## 2020-08-14 DIAGNOSIS — I10 ESSENTIAL HYPERTENSION: ICD-10-CM

## 2020-08-14 RX ORDER — METOPROLOL SUCCINATE 50 MG/1
TABLET, EXTENDED RELEASE ORAL
Qty: 90 TAB | Refills: 1 | Status: SHIPPED | OUTPATIENT
Start: 2020-08-14